# Patient Record
Sex: FEMALE | Employment: OTHER | URBAN - METROPOLITAN AREA
[De-identification: names, ages, dates, MRNs, and addresses within clinical notes are randomized per-mention and may not be internally consistent; named-entity substitution may affect disease eponyms.]

---

## 2020-11-21 ENCOUNTER — HOSPITAL ENCOUNTER (EMERGENCY)
Facility: HOSPITAL | Age: 80
Discharge: HOME | End: 2020-11-21
Payer: COMMERCIAL

## 2020-11-21 VITALS
HEART RATE: 92 BPM | OXYGEN SATURATION: 99 % | DIASTOLIC BLOOD PRESSURE: 55 MMHG | RESPIRATION RATE: 17 BRPM | TEMPERATURE: 98.1 F | SYSTOLIC BLOOD PRESSURE: 155 MMHG

## 2020-11-21 DIAGNOSIS — B02.9 HERPES ZOSTER WITHOUT COMPLICATION: Primary | ICD-10-CM

## 2020-11-21 PROCEDURE — 99284 EMERGENCY DEPT VISIT MOD MDM: CPT | Performed by: PHYSICIAN ASSISTANT

## 2020-11-21 PROCEDURE — 99283 EMERGENCY DEPT VISIT LOW MDM: CPT

## 2020-11-21 RX ORDER — VALACYCLOVIR HYDROCHLORIDE 1 G/1
1000 TABLET, FILM COATED ORAL 3 TIMES DAILY
Qty: 21 TABLET | Refills: 0 | Status: SHIPPED | OUTPATIENT
Start: 2020-11-21 | End: 2020-11-28

## 2020-11-21 RX ORDER — VALACYCLOVIR HYDROCHLORIDE 1 G/1
1000 TABLET, FILM COATED ORAL 3 TIMES DAILY
Qty: 21 TABLET | Refills: 0 | Status: SHIPPED | OUTPATIENT
Start: 2020-11-21 | End: 2020-11-21 | Stop reason: SDUPTHER

## 2022-08-29 ENCOUNTER — HOSPITAL ENCOUNTER (EMERGENCY)
Facility: HOSPITAL | Age: 82
Discharge: HOME | End: 2022-08-29
Attending: EMERGENCY MEDICINE
Payer: COMMERCIAL

## 2022-08-29 ENCOUNTER — APPOINTMENT (OUTPATIENT)
Dept: RADIOLOGY | Facility: HOSPITAL | Age: 82
End: 2022-08-29
Payer: COMMERCIAL

## 2022-08-29 ENCOUNTER — APPOINTMENT (EMERGENCY)
Dept: CT IMAGING | Facility: HOSPITAL | Age: 82
End: 2022-08-29
Payer: COMMERCIAL

## 2022-08-29 VITALS
HEIGHT: 65 IN | RESPIRATION RATE: 16 BRPM | OXYGEN SATURATION: 99 % | DIASTOLIC BLOOD PRESSURE: 86 MMHG | SYSTOLIC BLOOD PRESSURE: 145 MMHG | BODY MASS INDEX: 22.49 KG/M2 | WEIGHT: 135 LBS | HEART RATE: 86 BPM | TEMPERATURE: 98.6 F

## 2022-08-29 DIAGNOSIS — R53.83 FATIGUE: Primary | ICD-10-CM

## 2022-08-29 DIAGNOSIS — R19.7 DIARRHEA: ICD-10-CM

## 2022-08-29 LAB
2HR DELTA HS TROPONIN: 0 NG/L
ALBUMIN SERPL BCP-MCNC: 4.1 G/DL (ref 3.5–5)
ALP SERPL-CCNC: 39 U/L (ref 34–104)
ALT SERPL W P-5'-P-CCNC: 24 U/L (ref 7–52)
ANION GAP SERPL CALCULATED.3IONS-SCNC: 9 MMOL/L (ref 4–13)
AST SERPL W P-5'-P-CCNC: 19 U/L (ref 13–39)
BACTERIA UR QL AUTO: NORMAL /HPF
BASOPHILS # BLD AUTO: 0.05 THOUSANDS/ΜL (ref 0–0.1)
BASOPHILS NFR BLD AUTO: 1 % (ref 0–1)
BILIRUB SERPL-MCNC: 0.43 MG/DL (ref 0.2–1)
BILIRUB UR QL STRIP: NEGATIVE
BUN SERPL-MCNC: 21 MG/DL (ref 5–25)
CALCIUM SERPL-MCNC: 9.8 MG/DL (ref 8.4–10.2)
CARDIAC TROPONIN I PNL SERPL HS: 6 NG/L
CARDIAC TROPONIN I PNL SERPL HS: 6 NG/L
CHLORIDE SERPL-SCNC: 103 MMOL/L (ref 96–108)
CLARITY UR: CLEAR
CO2 SERPL-SCNC: 27 MMOL/L (ref 21–32)
COLOR UR: ABNORMAL
CREAT SERPL-MCNC: 1.38 MG/DL (ref 0.6–1.3)
EOSINOPHIL # BLD AUTO: 0.17 THOUSAND/ΜL (ref 0–0.61)
EOSINOPHIL NFR BLD AUTO: 2 % (ref 0–6)
ERYTHROCYTE [DISTWIDTH] IN BLOOD BY AUTOMATED COUNT: 13 % (ref 11.6–15.1)
FLUAV RNA RESP QL NAA+PROBE: NEGATIVE
FLUBV RNA RESP QL NAA+PROBE: NEGATIVE
GFR SERPL CREATININE-BSD FRML MDRD: 35 ML/MIN/1.73SQ M
GLUCOSE SERPL-MCNC: 106 MG/DL (ref 65–140)
GLUCOSE UR STRIP-MCNC: NEGATIVE MG/DL
HCT VFR BLD AUTO: 39.3 % (ref 34.8–46.1)
HGB BLD-MCNC: 13.4 G/DL (ref 11.5–15.4)
HGB UR QL STRIP.AUTO: NEGATIVE
IMM GRANULOCYTES # BLD AUTO: 0.04 THOUSAND/UL (ref 0–0.2)
IMM GRANULOCYTES NFR BLD AUTO: 0 % (ref 0–2)
KETONES UR STRIP-MCNC: NEGATIVE MG/DL
LEUKOCYTE ESTERASE UR QL STRIP: ABNORMAL
LYMPHOCYTES # BLD AUTO: 2.78 THOUSANDS/ΜL (ref 0.6–4.47)
LYMPHOCYTES NFR BLD AUTO: 29 % (ref 14–44)
MCH RBC QN AUTO: 30.4 PG (ref 26.8–34.3)
MCHC RBC AUTO-ENTMCNC: 34.1 G/DL (ref 31.4–37.4)
MCV RBC AUTO: 89 FL (ref 82–98)
MONOCYTES # BLD AUTO: 0.89 THOUSAND/ΜL (ref 0.17–1.22)
MONOCYTES NFR BLD AUTO: 9 % (ref 4–12)
NEUTROPHILS # BLD AUTO: 5.76 THOUSANDS/ΜL (ref 1.85–7.62)
NEUTS SEG NFR BLD AUTO: 59 % (ref 43–75)
NITRITE UR QL STRIP: NEGATIVE
NON-SQ EPI CELLS URNS QL MICRO: NORMAL /HPF
NRBC BLD AUTO-RTO: 0 /100 WBCS
PH UR STRIP.AUTO: 6 [PH]
PLATELET # BLD AUTO: 143 THOUSANDS/UL (ref 149–390)
PMV BLD AUTO: 12.1 FL (ref 8.9–12.7)
POTASSIUM SERPL-SCNC: 3.3 MMOL/L (ref 3.5–5.3)
PROT SERPL-MCNC: 7.7 G/DL (ref 6.4–8.4)
PROT UR STRIP-MCNC: NEGATIVE MG/DL
RBC # BLD AUTO: 4.41 MILLION/UL (ref 3.81–5.12)
RBC #/AREA URNS AUTO: NORMAL /HPF
RSV RNA RESP QL NAA+PROBE: NEGATIVE
SARS-COV-2 RNA RESP QL NAA+PROBE: NEGATIVE
SODIUM SERPL-SCNC: 139 MMOL/L (ref 135–147)
SP GR UR STRIP.AUTO: <=1.005 (ref 1–1.03)
TSH SERPL DL<=0.05 MIU/L-ACNC: 3.3 UIU/ML (ref 0.45–4.5)
UROBILINOGEN UR QL STRIP.AUTO: 0.2 E.U./DL
WBC # BLD AUTO: 9.69 THOUSAND/UL (ref 4.31–10.16)
WBC #/AREA URNS AUTO: NORMAL /HPF

## 2022-08-29 PROCEDURE — 81001 URINALYSIS AUTO W/SCOPE: CPT | Performed by: EMERGENCY MEDICINE

## 2022-08-29 PROCEDURE — 80053 COMPREHEN METABOLIC PANEL: CPT | Performed by: EMERGENCY MEDICINE

## 2022-08-29 PROCEDURE — 99285 EMERGENCY DEPT VISIT HI MDM: CPT | Performed by: EMERGENCY MEDICINE

## 2022-08-29 PROCEDURE — 36415 COLL VENOUS BLD VENIPUNCTURE: CPT | Performed by: EMERGENCY MEDICINE

## 2022-08-29 PROCEDURE — 71045 X-RAY EXAM CHEST 1 VIEW: CPT

## 2022-08-29 PROCEDURE — 0241U HB NFCT DS VIR RESP RNA 4 TRGT: CPT | Performed by: EMERGENCY MEDICINE

## 2022-08-29 PROCEDURE — 85025 COMPLETE CBC W/AUTO DIFF WBC: CPT | Performed by: EMERGENCY MEDICINE

## 2022-08-29 PROCEDURE — 93005 ELECTROCARDIOGRAM TRACING: CPT

## 2022-08-29 PROCEDURE — 84443 ASSAY THYROID STIM HORMONE: CPT | Performed by: EMERGENCY MEDICINE

## 2022-08-29 PROCEDURE — 99285 EMERGENCY DEPT VISIT HI MDM: CPT

## 2022-08-29 PROCEDURE — G1004 CDSM NDSC: HCPCS

## 2022-08-29 PROCEDURE — 84484 ASSAY OF TROPONIN QUANT: CPT | Performed by: EMERGENCY MEDICINE

## 2022-08-29 PROCEDURE — 74176 CT ABD & PELVIS W/O CONTRAST: CPT

## 2022-08-29 RX ORDER — POTASSIUM CHLORIDE 20 MEQ/1
40 TABLET, EXTENDED RELEASE ORAL ONCE
Status: COMPLETED | OUTPATIENT
Start: 2022-08-29 | End: 2022-08-29

## 2022-08-29 RX ADMIN — POTASSIUM CHLORIDE 40 MEQ: 1500 TABLET, EXTENDED RELEASE ORAL at 21:07

## 2022-08-29 NOTE — ED TRIAGE NOTES
Via WR w/complaint of weakness x1 week; called PCP today & was informed "to take it easy"; contact w/daughter last week who has Covid; denies nausea, vomiting, diarrhea and/or fever; denies loss of appetite; denies abdominal pain and/or headache; denies chest pain and/or SOB; denies pain; denies slurred speech and/or changes in vision

## 2022-08-29 NOTE — ED PROVIDER NOTES
History  Chief Complaint   Patient presents with    Weakness - Generalized     Via WR w/complaint of weakness x1 week; called PCP today & was informed "to take it easy"; contact w/daughter last week who has Covid; denies nausea, vomiting, diarrhea and/or fever; denies loss of appetite; denies abdominal pain and/or headache; denies chest pain and/or SOB; denies pain; denies slurred speech and/or changes in vision     79year-old female with history of CKD, glaucoma, IBS, hypertension who presents for evaluation of generalized weakness  Patient reports that for the past week, she has felt very fatigued and drained  She thought that she was improving this morning when she awoke, however, returned to feeling fatigued throughout the day  She called her primary care physician today who reportedly told her that she probably has COVID and that she should rest   They were unwilling to see her in the office prompting her to seek evaluation in the emergency department  She has had occasional episodes of diarrhea throughout the week  She has a chronic cough that is unchanged from baseline  She also endorses urinary frequency but no other urinary symptoms  She is otherwise asymptomatic and denies chest pain, shortness of breath, abdominal pain, nausea, vomiting, fever  Prior to Admission Medications   Prescriptions Last Dose Informant Patient Reported? Taking?    LORazepam (ATIVAN) 0 5 mg tablet   Yes No   Sig: Take 0 5 mg by mouth daily as needed   Trusopt 2 % ophthalmic solution   Yes No   Sig: instill 1 drop into both eyes twice a day   Xalatan 0 005 % ophthalmic solution   Yes No   Sig: place 1 drop into both eyes at bedtime   candesartan-hydrochlorothiazide (Atacand HCT) 32-12 5 MG per tablet   Yes No   Sig: take 1 tablet by mouth daily   valACYclovir (VALTREX) 1,000 mg tablet   No No   Sig: Take 1 tablet (1,000 mg total) by mouth 3 (three) times a day for 7 days      Facility-Administered Medications: None Past Medical History:   Diagnosis Date    Anxiety     Arthritis     Chronic kidney disease (CKD)     GERD (gastroesophageal reflux disease)     Glaucoma     Hypertension     IBS (irritable bowel syndrome)     Renal disorder        Past Surgical History:   Procedure Laterality Date    APPENDECTOMY      CHOLECYSTECTOMY      PATELLAR TENDON REPAIR Right     TUBAL LIGATION         History reviewed  No pertinent family history  I have reviewed and agree with the history as documented  E-Cigarette/Vaping    E-Cigarette Use Never User      E-Cigarette/Vaping Substances    Nicotine No     THC No     CBD No     Flavoring No     Other No     Unknown No      Social History     Tobacco Use    Smoking status: Never Smoker    Smokeless tobacco: Never Used   Vaping Use    Vaping Use: Never used   Substance Use Topics    Alcohol use: Never    Drug use: Not Currently       Review of Systems   Constitutional: Positive for fatigue  Negative for chills and fever  Eyes: Negative for visual disturbance  Respiratory: Positive for cough (Chronic)  Negative for chest tightness and shortness of breath  Cardiovascular: Negative for chest pain and leg swelling  Gastrointestinal: Negative for abdominal pain, diarrhea, nausea and vomiting  Genitourinary: Positive for frequency  Negative for dysuria, flank pain and urgency  Musculoskeletal: Negative for back pain and gait problem  Skin: Negative for pallor and rash  Neurological: Positive for weakness  Negative for syncope, light-headedness and headaches  All other systems reviewed and are negative  Physical Exam  Physical Exam  Vitals and nursing note reviewed  Constitutional:       General: She is not in acute distress  Appearance: She is well-developed  HENT:      Head: Normocephalic and atraumatic        Nose: Nose normal       Mouth/Throat:      Mouth: Mucous membranes are moist    Eyes:      Extraocular Movements: Extraocular movements intact  Pupils: Pupils are equal, round, and reactive to light  Cardiovascular:      Rate and Rhythm: Normal rate and regular rhythm  Heart sounds: No murmur heard  No friction rub  No gallop  Pulmonary:      Effort: Pulmonary effort is normal       Breath sounds: Normal breath sounds  No wheezing, rhonchi or rales  Abdominal:      General: There is no distension  Palpations: Abdomen is soft  Tenderness: There is abdominal tenderness in the right lower quadrant, suprapubic area and left lower quadrant  There is no guarding or rebound  Musculoskeletal:         General: No swelling or tenderness  Normal range of motion  Cervical back: Normal range of motion and neck supple  Skin:     General: Skin is warm and dry  Coloration: Skin is not pale  Findings: No rash  Neurological:      General: No focal deficit present  Mental Status: She is alert and oriented to person, place, and time     Psychiatric:         Behavior: Behavior normal          Vital Signs  ED Triage Vitals [08/29/22 1837]   Temperature Pulse Respirations Blood Pressure SpO2   98 6 °F (37 °C) 90 16 155/75 98 %      Temp Source Heart Rate Source Patient Position - Orthostatic VS BP Location FiO2 (%)   Oral Monitor Sitting Left arm --      Pain Score       No Pain           Vitals:    08/29/22 1837 08/29/22 2145   BP: 155/75 145/86   Pulse: 90 86   Patient Position - Orthostatic VS: Sitting Lying         Visual Acuity      ED Medications  Medications   potassium chloride (K-DUR,KLOR-CON) CR tablet 40 mEq (40 mEq Oral Given 8/29/22 2107)       Diagnostic Studies  Results Reviewed     Procedure Component Value Units Date/Time    HS Troponin I 2hr [[id number]]  (Normal) Collected: 08/29/22 2114    Lab Status: Final result Specimen: Blood from Line, Venous Updated: 08/29/22 2142     hs TnI 2hr 6 ng/L      Delta 2hr hsTnI 0 ng/L     Urine Microscopic [[id number]]  (Normal) Collected: 08/29/22 2013 Lab Status: Final result Specimen: Urine, Clean Catch Updated: 08/29/22 2030     RBC, UA 0-1 /hpf      WBC, UA 1-2 /hpf      Epithelial Cells Occasional /hpf      Bacteria, UA Occasional /hpf     UA w Reflex to Microscopic w Reflex to Culture [[id number]]  (Abnormal) Collected: 08/29/22 2013    Lab Status: Final result Specimen: Urine, Clean Catch Updated: 08/29/22 2021     Color, UA Straw     Clarity, UA Clear     Specific Gravity, UA <=1 005     pH, UA 6 0     Leukocytes, UA Trace     Nitrite, UA Negative     Protein, UA Negative mg/dl      Glucose, UA Negative mg/dl      Ketones, UA Negative mg/dl      Urobilinogen, UA 0 2 E U /dl      Bilirubin, UA Negative     Occult Blood, UA Negative    CBC and differential [[id number]]  (Abnormal) Collected: 08/29/22 1924    Lab Status: Final result Specimen: Blood from Arm, Right Updated: 08/29/22 2015     WBC 9 69 Thousand/uL      RBC 4 41 Million/uL      Hemoglobin 13 4 g/dL      Hematocrit 39 3 %      MCV 89 fL      MCH 30 4 pg      MCHC 34 1 g/dL      RDW 13 0 %      MPV 12 1 fL      Platelets 803 Thousands/uL      nRBC 0 /100 WBCs      Neutrophils Relative 59 %      Immat GRANS % 0 %      Lymphocytes Relative 29 %      Monocytes Relative 9 %      Eosinophils Relative 2 %      Basophils Relative 1 %      Neutrophils Absolute 5 76 Thousands/µL      Immature Grans Absolute 0 04 Thousand/uL      Lymphocytes Absolute 2 78 Thousands/µL      Monocytes Absolute 0 89 Thousand/µL      Eosinophils Absolute 0 17 Thousand/µL      Basophils Absolute 0 05 Thousands/µL     TSH, 3rd generation with Free T4 reflex [[id number]]  (Normal) Collected: 08/29/22 1924    Lab Status: Final result Specimen: Blood from Arm, Right Updated: 08/29/22 2007     TSH 3RD GENERATON 3 303 uIU/mL     Narrative:      Patients undergoing fluorescein dye angiography may retain small amounts of fluorescein in the body for 48-72 hours post procedure   Samples containing fluorescein can produce falsely depressed TSH values  If the patient had this procedure,a specimen should be resubmitted post fluorescein clearance  FLU/RSV/COVID - if FLU/RSV clinically relevant [[id number]]  (Normal) Collected: 08/29/22 1913    Lab Status: Final result Specimen: Nares from Nose Updated: 08/29/22 2001     SARS-CoV-2 Negative     INFLUENZA A PCR Negative     INFLUENZA B PCR Negative     RSV PCR Negative    Narrative:      FOR PEDIATRIC PATIENTS - copy/paste COVID Guidelines URL to browser: [email or web address]  LinkCycle    SARS-CoV-2 assay is a Nucleic Acid Amplification assay intended for the  qualitative detection of nucleic acid from SARS-CoV-2 in nasopharyngeal  swabs  Results are for the presumptive identification of SARS-CoV-2 RNA  Positive results are indicative of infection with SARS-CoV-2, the virus  causing COVID-19, but do not rule out bacterial infection or co-infection  with other viruses  Laboratories within the [place] and its  territories are required to report all positive results to the appropriate  public health authorities  Negative results do not preclude SARS-CoV-2  infection and should not be used as the sole basis for treatment or other  patient management decisions  Negative results must be combined with  clinical observations, patient history, and epidemiological information  This test has not been FDA cleared or approved  This test has been authorized by FDA under an Emergency Use Authorization  (EUA)  This test is only authorized for the duration of time the  declaration that circumstances exist justifying the authorization of the  emergency use of an in vitro diagnostic tests for detection of SARS-CoV-2  virus and/or diagnosis of COVID-19 infection under section 564(b)(1) of  the Act, 21 U  S C  781WGE-6(M)(5), unless the authorization is terminated  or revoked sooner  The test has been validated but independent review by FDA  and CLIA is pending      Test performed using Cepheid GeneXpert: This RT-PCR assay targets N2,  a region unique to SARS-CoV-2  A conserved region in the E-gene was chosen  for pan-Sarbecovirus detection which includes SARS-CoV-2  HS Troponin 0hr (reflex protocol) [[id number]]  (Normal) Collected: 08/29/22 1924    Lab Status: Final result Specimen: Blood from Arm, Right Updated: 08/29/22 1958     hs TnI 0hr 6 ng/L     Comprehensive metabolic panel [[id number]]  (Abnormal) Collected: 08/29/22 1924    Lab Status: Final result Specimen: Blood from Arm, Right Updated: 08/29/22 1954     Sodium 139 mmol/L      Potassium 3 3 mmol/L      Chloride 103 mmol/L      CO2 27 mmol/L      ANION GAP 9 mmol/L      BUN 21 mg/dL      Creatinine 1 38 mg/dL      Glucose 106 mg/dL      Calcium 9 8 mg/dL      AST 19 U/L      ALT 24 U/L      Alkaline Phosphatase 39 U/L      Total Protein 7 7 g/dL      Albumin 4 1 g/dL      Total Bilirubin 0 43 mg/dL      eGFR 35 ml/min/1 73sq m     Narrative:      [facility] guidelines for Chronic Kidney Disease (CKD):     Stage 1 with normal or high GFR (GFR > 90 mL/min/1 73 square meters)    Stage 2 Mild CKD (GFR = 60-89 mL/min/1 73 square meters)    Stage 3A Moderate CKD (GFR = 45-59 mL/min/1 73 square meters)    Stage 3B Moderate CKD (GFR = 30-44 mL/min/1 73 square meters)    Stage 4 Severe CKD (GFR = 15-29 mL/min/1 73 square meters)    Stage 5 End Stage CKD (GFR <15 mL/min/1 73 square meters)  Note: GFR calculation is accurate only with a steady state creatinine                 CT abdomen pelvis wo contrast   Final Result by Samantha Pal MD (08/29 2047)         1  Diverticulosis  No evidence of acute diverticulitis  No acute findings in the abdomen or pelvis  Workstation performed: [id number]         XR chest 1 view portable   ED Interpretation by Prince Kelli MD (08/29 2001)   No acute cardiopulmonary abnormality        Final Result by Remy Parry MD (08/30 0949)      No acute cardiopulmonary disease  Workstation performed: [id number]                    Procedures  Procedures         ED Course  ED Course as of 09/03/22 1110   Mon Aug 29, 2022   1921 Procedure Note: EKG  Date/Time: 08/29/22 7:15 PM   Interpreted by: Carolann Baumann  Indications / Diagnosis: weakness  ECG reviewed by me, the ED Provider: yes   The EKG demonstrates:  Rhythm: rate 83, normal sinus  Intervals: normal intervals  Axis: normal axis  QRS/Blocks: incomplete RBBB  ST Changes: No acute ST Changes, no STD/NIMA  TWI V1, V2       1959 hs TnI 0hr: 6   1959 Creatinine(!): 1 38  Baseline 1 1-1 2   2000 Potassium(!): 3 3   2003 FLU/RSV/COVID - if FLU/RSV clinically relevant   [facility]   2017 CBC and differential(!)                               SBIRT 20yo+    Flowsheet Row Most Recent Value   SBIRT (23 yo +)    In order to provide better care to our patients, we are screening all of our patients for alcohol and drug use  Would it be okay to ask you these screening questions? No Filed at: 08/29/2022 1900                    MDM  Number of Diagnoses or Management Options  Diarrhea: new and requires workup  Fatigue: new and requires workup  Diagnosis management comments: 79 YOF who presents for fatigue, diarrhea  Stable vital signs, benign exam  Will obtain cardiac workup, abdominal labs, CTAP, UA, TSH  Signed out to Dr Sid Correia pending CT and delta troponin  On chart review, labs WNL and CTAP without acute findings  Patient discharged          Amount and/or Complexity of Data Reviewed  Clinical lab tests: ordered and reviewed  Tests in the radiology section of CPT®: ordered and reviewed  Review and summarize past medical records: yes    Risk of Complications, Morbidity, and/or Mortality  Presenting problems: high  Diagnostic procedures: low  Management options: low    Patient Progress  Patient progress: stable      Disposition  Final diagnoses:   Fatigue   Diarrhea     Time reflects when diagnosis was documented in both MDM as applicable and the Disposition within this note     Time User Action Codes Description Comment    8/29/2022  8:38 PM Wilhemina Cradle Add [R53 83] Fatigue     8/29/2022  8:38 PM Wilhemina Cradle Add [R19 7] Diarrhea       ED Disposition     ED Disposition   Discharge    Condition   Stable    Date/Time   Mon Aug 29, 2022  9:45 PM    [email or web address] 48 discharge to home/self care  Results of completed tests discussed  Return to ER precautions given, verbal and written, and questions answered satisfactorily  Follow-up Information     Follow up With Specialties Details Why Contact Info    Lucy Childs MD Internal Medicine In 2 days  [facility]  [address]  [phone]            Discharge Medication List as of 8/29/2022  9:45 PM      CONTINUE these medications which have NOT CHANGED    Details   candesartan-hydrochlorothiazide (Atacand HCT) 32-12 5 MG per tablet take 1 tablet by mouth daily, Historical Med      LORazepam (ATIVAN) 0 5 mg tablet Take 0 5 mg by mouth daily as needed, Starting Mon 4/5/2021, Historical Med      Trusopt 2 % ophthalmic solution instill 1 drop into both eyes twice a day, Historical Med      valACYclovir (VALTREX) 1,000 mg tablet Take 1 tablet (1,000 mg total) by mouth 3 (three) times a day for 7 days, Starting Sat 11/21/2020, Until Sat 11/28/2020, Print      Xalatan 0 005 % ophthalmic solution place 1 drop into both eyes at bedtime, Historical Med             No discharge procedures on file      PDMP Review     None          ED Provider  Electronically Signed by           Jacqulynn Canavan, MD  09/03/22 7174

## 2022-08-30 LAB
ATRIAL RATE: 83 BPM
P AXIS: 71 DEGREES
PR INTERVAL: 144 MS
QRS AXIS: -13 DEGREES
QRSD INTERVAL: 106 MS
QT INTERVAL: 408 MS
QTC INTERVAL: 479 MS
T WAVE AXIS: 26 DEGREES
VENTRICULAR RATE: 83 BPM

## 2022-08-30 PROCEDURE — 93010 ELECTROCARDIOGRAM REPORT: CPT | Performed by: INTERNAL MEDICINE

## 2022-08-30 NOTE — DISCHARGE INSTRUCTIONS
Follow up with your primary care physician  Please return to the emergency department if you develop worsening symptoms, chest pain, shortness of breath, fever, vomiting, or anything else concerning to you

## 2022-10-10 ENCOUNTER — APPOINTMENT (EMERGENCY)
Dept: CT IMAGING | Facility: HOSPITAL | Age: 82
End: 2022-10-10
Payer: COMMERCIAL

## 2022-10-10 ENCOUNTER — APPOINTMENT (OUTPATIENT)
Dept: RADIOLOGY | Facility: HOSPITAL | Age: 82
End: 2022-10-10
Payer: COMMERCIAL

## 2022-10-10 ENCOUNTER — HOSPITAL ENCOUNTER (EMERGENCY)
Facility: HOSPITAL | Age: 82
Discharge: HOME | End: 2022-10-10
Attending: EMERGENCY MEDICINE | Admitting: EMERGENCY MEDICINE
Payer: COMMERCIAL

## 2022-10-10 VITALS
SYSTOLIC BLOOD PRESSURE: 163 MMHG | RESPIRATION RATE: 18 BRPM | DIASTOLIC BLOOD PRESSURE: 73 MMHG | OXYGEN SATURATION: 98 % | HEART RATE: 90 BPM | TEMPERATURE: 97.8 F

## 2022-10-10 DIAGNOSIS — S09.90XA CLOSED HEAD INJURY, INITIAL ENCOUNTER: ICD-10-CM

## 2022-10-10 DIAGNOSIS — M54.6 ACUTE MIDLINE THORACIC BACK PAIN: ICD-10-CM
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
## 2022-10-10 DIAGNOSIS — M51.36 DEGENERATIVE DISC DISEASE, LUMBAR: ICD-10-CM

## 2022-10-10 DIAGNOSIS — M25.551 RIGHT HIP PAIN: ICD-10-CM

## 2022-10-10 PROCEDURE — 99282 EMERGENCY DEPT VISIT SF MDM: CPT | Performed by: EMERGENCY MEDICINE

## 2022-10-10 PROCEDURE — 72128 CT CHEST SPINE W/O DYE: CPT

## 2022-10-10 PROCEDURE — 70450 CT HEAD/BRAIN W/O DYE: CPT

## 2022-10-10 PROCEDURE — G1004 CDSM NDSC: HCPCS

## 2022-10-10 PROCEDURE — 99284 EMERGENCY DEPT VISIT MOD MDM: CPT

## 2022-10-10 PROCEDURE — 72131 CT LUMBAR SPINE W/O DYE: CPT

## 2022-10-10 PROCEDURE — 73552 X-RAY EXAM OF FEMUR 2/>: CPT

## 2022-10-10 PROCEDURE — 72170 X-RAY EXAM OF PELVIS: CPT

## 2022-10-10 NOTE — DISCHARGE INSTRUCTIONS
Follow up with your primary care physician  You can use tylenol and motrin every 6 hours as needed for pain  Please return to the emergency department if you develop worsening symptoms, severe pain, weakness in your legs, numbness in your legs, issues with your bowels or bladder, cannot walk, or anything else concerning to you

## 2022-10-10 NOTE — ED PROVIDER NOTES
History  Chief Complaint   Patient presents with   • Fall     Patient here with c/o a fall last night  Pt stated tripping over cat and hit head on left side  Pt denies LOC  Pt c/o of mid back pain that started this morning following fall last night  Denies any nausea  70-year-old female with history of arthritis, CKD, GERD, IBS, hypertension who presents for evaluation after fall  Patient reports that last night, she was trying to get into bed without disturbing her cat and accidentally rolled out of bed  She struck the left side of her head on the wall but did not lose consciousness  She was able to get herself up off of the floor and into bed  Since the fall, she has had midline thoracic and lumbar back pain as well as right hip and upper leg pain  Despite this pain, she has been able to ambulate and climb the stairs today  She did not take anything for pain this morning  She denies any headache, vision changes, weakness or numbness, chest pain, shortness of breath, abdominal pain, bowel/bladder dysfunction  Prior to Admission Medications   Prescriptions Last Dose Informant Patient Reported? Taking?    LORazepam (ATIVAN) 0 5 mg tablet 10/10/2022 at Unknown time  Yes Yes   Sig: Take 0 5 mg by mouth daily as needed   Trusopt 2 % ophthalmic solution Not Taking at Unknown time  Yes No   Sig: instill 1 drop into both eyes twice a day   Patient not taking: Reported on 10/10/2022   Xalatan 0 005 % ophthalmic solution 10/10/2022 at Unknown time  Yes Yes   Sig: place 1 drop into both eyes at bedtime   candesartan-hydrochlorothiazide (ATACAND HCT) 32-12 5 MG per tablet Not Taking at Unknown time  Yes No   Sig: take 1 tablet by mouth daily   Patient not taking: Reported on 10/10/2022   valACYclovir (VALTREX) 1,000 mg tablet   No No   Sig: Take 1 tablet (1,000 mg total) by mouth 3 (three) times a day for 7 days      Facility-Administered Medications: None       Past Medical History:   Diagnosis Date   • Anxiety    • Arthritis    • Chronic kidney disease (CKD)    • GERD (gastroesophageal reflux disease)    • Glaucoma    • Hypertension    • IBS (irritable bowel syndrome)    • Renal disorder        Past Surgical History:   Procedure Laterality Date   • APPENDECTOMY     • CHOLECYSTECTOMY     • PATELLAR TENDON REPAIR Right    • TUBAL LIGATION         History reviewed  No pertinent family history  I have reviewed and agree with the history as documented  E-Cigarette/Vaping   • E-Cigarette Use Never User      E-Cigarette/Vaping Substances   • Nicotine No    • THC No    • CBD No    • Flavoring No    • Other No    • Unknown No      Social History     Tobacco Use   • Smoking status: Never Smoker   • Smokeless tobacco: Never Used   Vaping Use   • Vaping Use: Never used   Substance Use Topics   • Alcohol use: Never   • Drug use: Not Currently       Review of Systems   Constitutional: Negative for chills and fever  HENT: Negative for congestion and sore throat  Eyes: Negative for visual disturbance  Respiratory: Negative for chest tightness and shortness of breath  Cardiovascular: Negative for chest pain and leg swelling  Gastrointestinal: Negative for abdominal pain, nausea and vomiting  Genitourinary: Negative for difficulty urinating and flank pain  Musculoskeletal: Positive for arthralgias and back pain  Negative for gait problem and neck pain  Skin: Negative for rash and wound  Neurological: Negative for syncope, weakness, light-headedness, numbness and headaches  All other systems reviewed and are negative  Physical Exam  Physical Exam  Vitals and nursing note reviewed  Constitutional:       General: She is not in acute distress  Appearance: She is well-developed  She is not ill-appearing  HENT:      Head: Normocephalic and atraumatic        Nose: Nose normal       Mouth/Throat:      Mouth: Mucous membranes are moist       Pharynx: No oropharyngeal exudate or posterior oropharyngeal erythema  Comments: No intraoral injury  Eyes:      Extraocular Movements: Extraocular movements intact  Pupils: Pupils are equal, round, and reactive to light  Cardiovascular:      Rate and Rhythm: Normal rate and regular rhythm  Heart sounds: No murmur heard  No friction rub  No gallop  Pulmonary:      Effort: Pulmonary effort is normal       Breath sounds: Normal breath sounds  No wheezing, rhonchi or rales  Chest:      Chest wall: No tenderness  Abdominal:      General: There is no distension  Palpations: Abdomen is soft  Tenderness: There is no abdominal tenderness  Musculoskeletal:         General: No swelling or tenderness  Normal range of motion  Cervical back: Normal range of motion and neck supple  No tenderness  Comments: No midline T or L-spine tenderness  No tenderness to the right hip or femur, however, there is pain with range of motion of the right hip  All other joints with full, painless range of motion and nontender  Skin:     General: Skin is warm and dry  Coloration: Skin is not pale  Findings: No rash  Comments: No external signs of trauma  Neurological:      General: No focal deficit present  Mental Status: She is alert and oriented to person, place, and time  Comments: Cranial nerves 2-12 intact  5/5 strength and sensation intact all 4 extremities     Psychiatric:         Behavior: Behavior normal          Vital Signs  ED Triage Vitals [10/10/22 1325]   Temperature Pulse Respirations Blood Pressure SpO2   97 8 °F (36 6 °C) 94 18 163/73 100 %      Temp Source Heart Rate Source Patient Position - Orthostatic VS BP Location FiO2 (%)   Oral Monitor Sitting Right arm --      Pain Score       --           Vitals:    10/10/22 1325 10/10/22 1458   BP: 163/73    Pulse: 94 90   Patient Position - Orthostatic VS: Sitting          Visual Acuity      ED Medications  Medications - No data to display    Diagnostic Studies  Results Reviewed     None                 CT thoracic spine without contrast   Final Result by Laurie Keith MD (10/10 2166)   1  The thoracic vertebral body heights are maintained demonstrating no acute fracture or subluxation  2  Central disc osteophyte complex is noted at the T6-7 level with mild to moderate central canal narrowing  3  Subcentimeter sclerotic lesion within the inferior T3 vertebral body, probable bone island  Workstation performed: [id number]         CT lumbar spine without contrast   Final Result by Laurie Keith MD (10/10 )   1  The lumbar vertebral body heights are maintained demonstrating no acute fracture or subluxation  2  Multilevel degenerative disc disease with moderate to severe central canal stenosis at L4-5  Workstation performed: [id number]         CT head without contrast   Final Result by Laurie Keith MD (10/10 [phone])      1  No acute intracranial abnormality  2   Mild chronic microangiopathic ischemic changes  Workstation performed: [id number]         XR femur 2 views RIGHT   Final Result by Viktoria Zeng MD (10/10 [phone])      No acute osseous abnormality  Workstation performed: [id number]         XR pelvis ap only 1 or 2 vw   Final Result by Viktoria Zeng MD (10/10 [phone])      No acute osseous abnormality  Workstation performed: [id number]                    Procedures  Procedures         ED Course                                             MDM  Number of Diagnoses or Management Options  Acute midline thoracic back pain: new and requires workup  Closed head injury, initial encounter: new and requires workup  Degenerative disc disease, lumbar: new and requires workup  Fall, initial encounter: new and requires workup  Right hip pain: new and requires workup  Diagnosis management comments: 79year old female who presents for evaluation after a mechanical fall last evening   Given head strike, will obtain CT head as well as CT T and L spine given new pain  Will XR the right hip and femur  Offered analgesia but patient declining at this time  Imaging without any acute traumatic injuries  Incidentally noted chronic degenerative disc disease which patient was informed of  No red flag signs or symptoms regarding her back pain, normal neurologic exam  Patient able to ambulate  Advised follow up with primary care physician  Return precautions discussed  Amount and/or Complexity of Data Reviewed  Tests in the radiology section of CPT®: ordered and reviewed  Review and summarize past medical records: yes    Risk of Complications, Morbidity, and/or Mortality  Presenting problems: high  Diagnostic procedures: low  Management options: minimal    Patient Progress  Patient progress: stable      Disposition  Final diagnoses:   Fall, initial encounter   Closed head injury, initial encounter   Acute midline thoracic back pain   Right hip pain   Degenerative disc disease, lumbar     Time reflects when diagnosis was documented in both MDM as applicable and the Disposition within this note     Time User Action Codes Description Comment    10/10/2022  3:38 PM Geofm Alona Add [C17  ZKFN] Fall, initial encounter     10/10/2022  3:38 PM Geofm Alona Add [S09 90XA] Closed head injury, initial encounter     10/10/2022  3:38 PM Geofm Alona Add [M54 6] Acute midline thoracic back pain     10/10/2022  3:38 PM Geofm Alona Add [M25 551] Right hip pain     10/10/2022  3:39 PM Geofm Alona Add [M51 36] Degenerative disc disease, lumbar       ED Disposition     ED Disposition   Discharge    Condition   Stable    Date/Time   Mon Oct 10, 2022  3:38 PM    Comment   Nicole Washburn discharge to home/self care                 Follow-up Information     Follow up With Specialties Details Why Contact Info    Rodrigo Cruz MD Internal Medicine In 2 days  [address]             Discharge Medication List as of 10/10/2022  3:39 PM      CONTINUE these medications which have NOT CHANGED    Details   LORazepam (ATIVAN) 0 5 mg tablet Take 0 5 mg by mouth daily as needed, Starting Mon 4/5/2021, Historical Med      Xalatan 0 005 % ophthalmic solution place 1 drop into both eyes at bedtime, Historical Med      candesartan-hydrochlorothiazide (ATACAND HCT) 32-12 5 MG per tablet take 1 tablet by mouth daily, Historical Med      Trusopt 2 % ophthalmic solution instill 1 drop into both eyes twice a day, Historical Med      valACYclovir (VALTREX) 1,000 mg tablet Take 1 tablet (1,000 mg total) by mouth 3 (three) times a day for 7 days, Starting Sat 11/21/2020, Until Sat 11/28/2020, Print             No discharge procedures on file      PDMP Review     None          ED Provider  Electronically Signed by           Mila Alexis MD  10/10/22 9232

## 2022-10-24 ENCOUNTER — APPOINTMENT (EMERGENCY)
Dept: CT IMAGING | Facility: HOSPITAL | Age: 82
End: 2022-10-24
Payer: COMMERCIAL

## 2022-10-24 ENCOUNTER — HOSPITAL ENCOUNTER (EMERGENCY)
Facility: HOSPITAL | Age: 82
Discharge: HOME | End: 2022-10-24
Attending: EMERGENCY MEDICINE
Payer: COMMERCIAL

## 2022-10-24 VITALS
TEMPERATURE: 97.9 F | RESPIRATION RATE: 18 BRPM | HEART RATE: 79 BPM | HEIGHT: 62 IN | OXYGEN SATURATION: 98 % | BODY MASS INDEX: 24.84 KG/M2 | SYSTOLIC BLOOD PRESSURE: 134 MMHG | DIASTOLIC BLOOD PRESSURE: 50 MMHG | WEIGHT: 135 LBS

## 2022-10-24 DIAGNOSIS — R42 DIZZINESS: Primary | ICD-10-CM

## 2022-10-24 LAB
2HR DELTA HS TROPONIN: 0 NG/L
ALBUMIN SERPL BCP-MCNC: 3.9 G/DL (ref 3.5–5)
ALP SERPL-CCNC: 51 U/L (ref 34–104)
ALT SERPL W P-5'-P-CCNC: 21 U/L (ref 7–52)
ANION GAP SERPL CALCULATED.3IONS-SCNC: 9 MMOL/L (ref 4–13)
AST SERPL W P-5'-P-CCNC: 20 U/L (ref 13–39)
BASOPHILS # BLD AUTO: 0.07 THOUSANDS/ÂΜL (ref 0–0.1)
BASOPHILS NFR BLD AUTO: 1 % (ref 0–1)
BILIRUB SERPL-MCNC: 0.27 MG/DL (ref 0.2–1)
BUN SERPL-MCNC: 23 MG/DL (ref 5–25)
CALCIUM SERPL-MCNC: 9.8 MG/DL (ref 8.4–10.2)
CARDIAC TROPONIN I PNL SERPL HS: 7 NG/L
CARDIAC TROPONIN I PNL SERPL HS: 7 NG/L
CHLORIDE SERPL-SCNC: 102 MMOL/L (ref 96–108)
CO2 SERPL-SCNC: 29 MMOL/L (ref 21–32)
CREAT SERPL-MCNC: 1.21 MG/DL (ref 0.6–1.3)
EOSINOPHIL # BLD AUTO: 0.21 THOUSAND/ÂΜL (ref 0–0.61)
EOSINOPHIL NFR BLD AUTO: 2 % (ref 0–6)
ERYTHROCYTE [DISTWIDTH] IN BLOOD BY AUTOMATED COUNT: 13.1 % (ref 11.6–15.1)
GFR SERPL CREATININE-BSD FRML MDRD: 41 ML/MIN/1.73SQ M
GLUCOSE SERPL-MCNC: 120 MG/DL (ref 65–140)
HCT VFR BLD AUTO: 40.9 % (ref 34.8–46.1)
HGB BLD-MCNC: 13.5 G/DL (ref 11.5–15.4)
IMM GRANULOCYTES # BLD AUTO: 0.03 THOUSAND/UL (ref 0–0.2)
IMM GRANULOCYTES NFR BLD AUTO: 0 % (ref 0–2)
LYMPHOCYTES # BLD AUTO: 3.44 THOUSANDS/ÂΜL (ref 0.6–4.47)
LYMPHOCYTES NFR BLD AUTO: 33 % (ref 14–44)
MCH RBC QN AUTO: 30.3 PG (ref 26.8–34.3)
MCHC RBC AUTO-ENTMCNC: 33 G/DL (ref 31.4–37.4)
MCV RBC AUTO: 92 FL (ref 82–98)
MONOCYTES # BLD AUTO: 1.02 THOUSAND/ÂΜL (ref 0.17–1.22)
MONOCYTES NFR BLD AUTO: 10 % (ref 4–12)
NEUTROPHILS # BLD AUTO: 5.53 THOUSANDS/ÂΜL (ref 1.85–7.62)
NEUTS SEG NFR BLD AUTO: 54 % (ref 43–75)
NRBC BLD AUTO-RTO: 0 /100 WBCS
PLATELET # BLD AUTO: 216 THOUSANDS/UL (ref 149–390)
PMV BLD AUTO: 11.5 FL (ref 8.9–12.7)
POTASSIUM SERPL-SCNC: 3.4 MMOL/L (ref 3.5–5.3)
PROT SERPL-MCNC: 7.5 G/DL (ref 6.4–8.4)
RBC # BLD AUTO: 4.45 MILLION/UL (ref 3.81–5.12)
SODIUM SERPL-SCNC: 140 MMOL/L (ref 135–147)
WBC # BLD AUTO: 10.3 THOUSAND/UL (ref 4.31–10.16)

## 2022-10-24 PROCEDURE — 93005 ELECTROCARDIOGRAM TRACING: CPT

## 2022-10-24 PROCEDURE — 84484 ASSAY OF TROPONIN QUANT: CPT

## 2022-10-24 PROCEDURE — 70450 CT HEAD/BRAIN W/O DYE: CPT

## 2022-10-24 PROCEDURE — G1004 CDSM NDSC: HCPCS

## 2022-10-24 PROCEDURE — 99284 EMERGENCY DEPT VISIT MOD MDM: CPT | Performed by: EMERGENCY MEDICINE

## 2022-10-24 PROCEDURE — 80053 COMPREHEN METABOLIC PANEL: CPT

## 2022-10-24 PROCEDURE — 36415 COLL VENOUS BLD VENIPUNCTURE: CPT

## 2022-10-24 PROCEDURE — 85025 COMPLETE CBC W/AUTO DIFF WBC: CPT

## 2022-10-24 RX ORDER — POTASSIUM CHLORIDE 20 MEQ/1
20 TABLET, EXTENDED RELEASE ORAL ONCE
Status: COMPLETED | OUTPATIENT
Start: 2022-10-24 | End: 2022-10-24

## 2022-10-24 RX ADMIN — POTASSIUM CHLORIDE 20 MEQ: 1500 TABLET, EXTENDED RELEASE ORAL at 21:27

## 2022-10-25 NOTE — ED PROVIDER NOTES
History  Chief Complaint   Patient presents with   • Dizziness     States she was watching TV and became "lightheaded" states when she stand she feels as if she will fall over     The patient reports that she suddenly felt lightheaded when she was sitting and watching TV  He then stood up and felt like she was “drunk" because she felt off balance  She denies any difficulty with coordination  She denies any numbness  No difficulty with speech or swallowing  She has a just before this happened she had been bending forward for about 30 minutes because she was “doing her toes  “  She feels well when she is in bed but still feels somewhat unsteady when she is on her feet  Nevertheless, she is still able to ambulate despite this unsteady feeling  She denies any chest pain or discomfort  She denies any fevers or chills  She denies any difficulty breathing  No cough congestion  No passing out  No palpitations  No hearing loss  Symptoms were abrupt in onset and are intermittent with movement  Prior to Admission Medications   Prescriptions Last Dose Informant Patient Reported? Taking?    LORazepam (ATIVAN) 0 5 mg tablet   Yes No   Sig: Take 0 5 mg by mouth daily as needed   Trusopt 2 % ophthalmic solution   Yes No   Sig: instill 1 drop into both eyes twice a day   Patient not taking: Reported on 10/10/2022   Xalatan 0 005 % ophthalmic solution   Yes No   Sig: place 1 drop into both eyes at bedtime   candesartan-hydrochlorothiazide (ATACAND HCT) 32-12 5 MG per tablet   Yes No   Sig: take 1 tablet by mouth daily   Patient not taking: Reported on 10/10/2022   valACYclovir (VALTREX) 1,000 mg tablet   No No   Sig: Take 1 tablet (1,000 mg total) by mouth 3 (three) times a day for 7 days      Facility-Administered Medications: None       Past Medical History:   Diagnosis Date   • Anxiety    • Arthritis    • Chronic kidney disease (CKD)    • GERD (gastroesophageal reflux disease)    • Glaucoma    • Hypertension • IBS (irritable bowel syndrome)    • Renal disorder        Past Surgical History:   Procedure Laterality Date   • APPENDECTOMY     • CHOLECYSTECTOMY     • PATELLAR TENDON REPAIR Right    • TUBAL LIGATION         History reviewed  No pertinent family history  I have reviewed and agree with the history as documented  E-Cigarette/Vaping   • E-Cigarette Use Never User      E-Cigarette/Vaping Substances   • Nicotine No    • THC No    • CBD No    • Flavoring No    • Other No    • Unknown No      Social History     Tobacco Use   • Smoking status: Never Smoker   • Smokeless tobacco: Never Used   Vaping Use   • Vaping Use: Never used   Substance Use Topics   • Alcohol use: Never   • Drug use: Not Currently       Review of Systems   All other systems reviewed and are negative  Physical Exam  Physical Exam  Vitals and nursing note reviewed  Constitutional:       General: She is not in acute distress  Appearance: She is well-developed  HENT:      Head: Normocephalic and atraumatic  Eyes:      General: No visual field deficit  Conjunctiva/sclera: Conjunctivae normal    Cardiovascular:      Rate and Rhythm: Normal rate and regular rhythm  Heart sounds: No murmur heard  Pulmonary:      Effort: Pulmonary effort is normal  No respiratory distress  Breath sounds: Normal breath sounds  Abdominal:      Palpations: Abdomen is soft  Tenderness: There is no abdominal tenderness  Musculoskeletal:      Cervical back: Neck supple  Skin:     General: Skin is warm and dry  Capillary Refill: Capillary refill takes 2 to 3 seconds  Neurological:      Mental Status: She is alert  Cranial Nerves: Cranial nerves are intact  No facial asymmetry  Sensory: Sensation is intact  Motor: No weakness or pronator drift  Coordination: Heel to Shin Test normal       Gait: Gait is intact  Comments: Normal finger-to-nose    Patient has no nystagmus        Negative [place]-Hallpike maneuver         Vital Signs  ED Triage Vitals [10/24/22 2042]   Temperature Pulse Respirations Blood Pressure SpO2   97 9 °F (36 6 °C) 89 18 (!) 176/60 98 %      Temp Source Heart Rate Source Patient Position - Orthostatic VS BP Location FiO2 (%)   Oral -- -- -- --      Pain Score       No Pain           Vitals:    10/24/22 2042 10/24/22 2125 10/24/22 2230   BP: (!) 176/60 150/57 134/50   Pulse: 89 76 79         Visual Acuity  Visual Acuity    Flowsheet Row Most Recent Value   L Pupil Size (mm) 3   R Pupil Size (mm) 3          ED Medications  Medications   potassium chloride (K-DUR,KLOR-CON) CR tablet 20 mEq (20 mEq Oral Given 10/24/22 2127)       Diagnostic Studies  Results Reviewed     Procedure Component Value Units Date/Time    HS Troponin I 2hr [[id number]]  (Normal) Collected: 10/24/22 2254    Lab Status: Final result Specimen: Blood from Arm, Right Updated: 10/24/22 2321     hs TnI 2hr 7 ng/L      Delta 2hr hsTnI 0 ng/L     HS Troponin I 4hr [[id number]]     Lab Status: No result Specimen: Blood     HS Troponin 0hr (reflex protocol) [[id number]]  (Normal) Collected: 10/24/22 2055    Lab Status: Final result Specimen: Blood from Arm, Right Updated: 10/24/22 2123     hs TnI 0hr 7 ng/L     Comprehensive metabolic panel [[id number]]  (Abnormal) Collected: 10/24/22 2055    Lab Status: Final result Specimen: Blood from Arm, Right Updated: 10/24/22 2117     Sodium 140 mmol/L      Potassium 3 4 mmol/L      Chloride 102 mmol/L      CO2 29 mmol/L      ANION GAP 9 mmol/L      BUN 23 mg/dL      Creatinine 1 21 mg/dL      Glucose 120 mg/dL      Calcium 9 8 mg/dL      AST 20 U/L      ALT 21 U/L      Alkaline Phosphatase 51 U/L      Total Protein 7 5 g/dL      Albumin 3 9 g/dL      Total Bilirubin 0 27 mg/dL      eGFR 41 ml/min/1 73sq m     Narrative:      Sameer guidelines for Chronic Kidney Disease (CKD):   •  Stage 1 with normal or high GFR (GFR > 90 mL/min/1 73 square meters)  •  Stage 2 Mild CKD (GFR = 60-89 mL/min/1 73 square meters)  •  Stage 3A Moderate CKD (GFR = 45-59 mL/min/1 73 square meters)  •  Stage 3B Moderate CKD (GFR = 30-44 mL/min/1 73 square meters)  •  Stage 4 Severe CKD (GFR = 15-29 mL/min/1 73 square meters)  •  Stage 5 End Stage CKD (GFR <15 mL/min/1 73 square meters)  Note: GFR calculation is accurate only with a steady state creatinine    CBC and differential [[id number]]  (Abnormal) Collected: 10/24/22 2055    Lab Status: Final result Specimen: Blood from Arm, Right Updated: 10/24/22 2100     WBC 10 30 Thousand/uL      RBC 4 45 Million/uL      Hemoglobin 13 5 g/dL      Hematocrit 40 9 %      MCV 92 fL      MCH 30 3 pg      MCHC 33 0 g/dL      RDW 13 1 %      MPV 11 5 fL      Platelets 209 Thousands/uL      nRBC 0 /100 WBCs      Neutrophils Relative 54 %      Immat GRANS % 0 %      Lymphocytes Relative 33 %      Monocytes Relative 10 %      Eosinophils Relative 2 %      Basophils Relative 1 %      Neutrophils Absolute 5 53 Thousands/µL      Immature Grans Absolute 0 03 Thousand/uL      Lymphocytes Absolute 3 44 Thousands/µL      Monocytes Absolute 1 02 Thousand/µL      Eosinophils Absolute 0 21 Thousand/µL      Basophils Absolute 0 07 Thousands/µL                  CT head without contrast   Final Result by Oanh Hadley MD (10/24 2225)      No intracranial hemorrhage or calvarial fracture  Workstation performed: [id number]                    Procedures  Procedures         ED Course  ED Course as of 10/24/22 2344   Mon Oct 24, 2022   2100 EKG: SR at 80 with RBBB, normal ST-t, Qtc 475   2323 On reexamination, patient continues abnormal gait  Her neuro exam remains intact without nystagmus and without ataxia  I discussed red flags with patient that should prompt return to the emergency department  Patient verbalized understanding and agreed                                   SBIRT 22yo+    Flowsheet Row Most Recent Value   SBIRT (23 yo +)    In order to provide better care to our patients, we are screening all of our patients for alcohol and drug use  Would it be okay to ask you these screening questions? No Filed at: 10/24/2022 2050                    [facility]  Number of Diagnoses or Management Options  Diagnosis management comments: I evaluated the patient  She has no nystagmus at rest   Was also not able to elicit any nystagmus with [place]-Hallpike maneuver  Nevertheless, patient has normal neuro exam without ataxia and had abrupt dizziness after bending forward for 30 minutes  History and exam more consistent with positional vertigo  Will nevertheless order labs and CT due to age  Amount and/or Complexity of Data Reviewed  Clinical lab tests: ordered and reviewed  Tests in the radiology section of CPT®: ordered and reviewed        Disposition  Final diagnoses:   Dizziness     Time reflects when diagnosis was documented in both MDM as applicable and the Disposition within this note     Time User Action Codes Description Comment    10/24/2022 11:24 PM Sheila Client Add [R42] Dizziness       ED Disposition     ED Disposition   Discharge    Condition   Stable    Date/Time   Mon Oct 24, 2022 11:24 PM    Oniel 48 discharge to home/self care                 Follow-up Information     Follow up With Specialties Details Why Roberth Malloy MD Internal Medicine In 3 days  [address]  [phone]            Discharge Medication List as of 10/24/2022 11:25 PM      CONTINUE these medications which have NOT CHANGED    Details   candesartan-hydrochlorothiazide (ATACAND HCT) 32-12 5 MG per tablet take 1 tablet by mouth daily, Historical Med      LORazepam (ATIVAN) 0 5 mg tablet Take 0 5 mg by mouth daily as needed, Starting Mon 4/5/2021, Historical Med      Trusopt 2 % ophthalmic solution instill 1 drop into both eyes twice a day, Historical Med      valACYclovir (VALTREX) 1,000 mg tablet Take 1 tablet (1,000 mg total) by mouth 3 (three) times a day for 7 days, Starting Sat 11/21/2020, Until Sat 11/28/2020, Print      Xalatan 0 005 % ophthalmic solution place 1 drop into both eyes at bedtime, Historical Med             No discharge procedures on file      PDMP Review     None          ED Provider  Electronically Signed by           Rambo Zamorano MD  10/24/22 6175

## 2022-10-27 LAB
ATRIAL RATE: 84 BPM
P AXIS: 52 DEGREES
PR INTERVAL: 110 MS
QRS AXIS: -14 DEGREES
QRSD INTERVAL: 124 MS
QT INTERVAL: 404 MS
QTC INTERVAL: 475 MS
T WAVE AXIS: 52 DEGREES
VENTRICULAR RATE: 83 BPM

## 2022-10-27 PROCEDURE — 93010 ELECTROCARDIOGRAM REPORT: CPT | Performed by: INTERNAL MEDICINE

## 2023-06-03 ENCOUNTER — HOSPITAL ENCOUNTER (EMERGENCY)
Facility: HOSPITAL | Age: 83
Discharge: HOME | End: 2023-06-03
Attending: EMERGENCY MEDICINE
Payer: COMMERCIAL

## 2023-06-03 ENCOUNTER — APPOINTMENT (EMERGENCY)
Dept: RADIOLOGY | Facility: HOSPITAL | Age: 83
End: 2023-06-03
Payer: COMMERCIAL

## 2023-06-03 VITALS
TEMPERATURE: 97.6 F | DIASTOLIC BLOOD PRESSURE: 65 MMHG | SYSTOLIC BLOOD PRESSURE: 152 MMHG | OXYGEN SATURATION: 99 % | RESPIRATION RATE: 16 BRPM | HEART RATE: 89 BPM

## 2023-06-03 DIAGNOSIS — J06.9 VIRAL URI WITH COUGH: Primary | ICD-10-CM

## 2023-06-03 DIAGNOSIS — R53.83 FATIGUE: ICD-10-CM

## 2023-06-03 LAB
ALBUMIN SERPL BCP-MCNC: 4.1 G/DL (ref 3.5–5)
ALP SERPL-CCNC: 40 U/L (ref 34–104)
ALT SERPL W P-5'-P-CCNC: 19 U/L (ref 7–52)
ANION GAP SERPL CALCULATED.3IONS-SCNC: 9 MMOL/L (ref 4–13)
AST SERPL W P-5'-P-CCNC: 19 U/L (ref 13–39)
BACTERIA UR QL AUTO: ABNORMAL /HPF
BASOPHILS # BLD AUTO: 0.06 THOUSANDS/ÂΜL (ref 0–0.1)
BASOPHILS NFR BLD AUTO: 1 % (ref 0–1)
BILIRUB SERPL-MCNC: 0.52 MG/DL (ref 0.2–1)
BILIRUB UR QL STRIP: NEGATIVE
BUN SERPL-MCNC: 26 MG/DL (ref 5–25)
CALCIUM SERPL-MCNC: 9.6 MG/DL (ref 8.4–10.2)
CHLORIDE SERPL-SCNC: 106 MMOL/L (ref 96–108)
CLARITY UR: CLEAR
CO2 SERPL-SCNC: 24 MMOL/L (ref 21–32)
COLOR UR: YELLOW
CREAT SERPL-MCNC: 1.2 MG/DL (ref 0.6–1.3)
EOSINOPHIL # BLD AUTO: 0.18 THOUSAND/ÂΜL (ref 0–0.61)
EOSINOPHIL NFR BLD AUTO: 2 % (ref 0–6)
ERYTHROCYTE [DISTWIDTH] IN BLOOD BY AUTOMATED COUNT: 12.9 % (ref 11.6–15.1)
FLUAV RNA RESP QL NAA+PROBE: NEGATIVE
FLUBV RNA RESP QL NAA+PROBE: NEGATIVE
GFR SERPL CREATININE-BSD FRML MDRD: 41 ML/MIN/1.73SQ M
GLUCOSE SERPL-MCNC: 124 MG/DL (ref 65–140)
GLUCOSE UR STRIP-MCNC: NEGATIVE MG/DL
HCT VFR BLD AUTO: 41.5 % (ref 34.8–46.1)
HGB BLD-MCNC: 13.7 G/DL (ref 11.5–15.4)
HGB UR QL STRIP.AUTO: NEGATIVE
IMM GRANULOCYTES # BLD AUTO: 0.04 THOUSAND/UL (ref 0–0.2)
IMM GRANULOCYTES NFR BLD AUTO: 1 % (ref 0–2)
KETONES UR STRIP-MCNC: NEGATIVE MG/DL
LEUKOCYTE ESTERASE UR QL STRIP: ABNORMAL
LYMPHOCYTES # BLD AUTO: 2.54 THOUSANDS/ÂΜL (ref 0.6–4.47)
LYMPHOCYTES NFR BLD AUTO: 30 % (ref 14–44)
MCH RBC QN AUTO: 30 PG (ref 26.8–34.3)
MCHC RBC AUTO-ENTMCNC: 33 G/DL (ref 31.4–37.4)
MCV RBC AUTO: 91 FL (ref 82–98)
MONOCYTES # BLD AUTO: 0.73 THOUSAND/ÂΜL (ref 0.17–1.22)
MONOCYTES NFR BLD AUTO: 9 % (ref 4–12)
NEUTROPHILS # BLD AUTO: 4.92 THOUSANDS/ÂΜL (ref 1.85–7.62)
NEUTS SEG NFR BLD AUTO: 57 % (ref 43–75)
NITRITE UR QL STRIP: NEGATIVE
NON-SQ EPI CELLS URNS QL MICRO: ABNORMAL /HPF
NRBC BLD AUTO-RTO: 0 /100 WBCS
OTHER STN SPEC: ABNORMAL
PH UR STRIP.AUTO: 5.5 [PH]
PLATELET # BLD AUTO: 239 THOUSANDS/UL (ref 149–390)
PMV BLD AUTO: 12.1 FL (ref 8.9–12.7)
POTASSIUM SERPL-SCNC: 3.6 MMOL/L (ref 3.5–5.3)
PROT SERPL-MCNC: 7.8 G/DL (ref 6.4–8.4)
PROT UR STRIP-MCNC: NEGATIVE MG/DL
RBC # BLD AUTO: 4.57 MILLION/UL (ref 3.81–5.12)
RBC #/AREA URNS AUTO: ABNORMAL /HPF
RSV RNA RESP QL NAA+PROBE: NEGATIVE
SARS-COV-2 RNA RESP QL NAA+PROBE: NEGATIVE
SODIUM SERPL-SCNC: 139 MMOL/L (ref 135–147)
SP GR UR STRIP.AUTO: 1.01 (ref 1–1.03)
TSH SERPL DL<=0.05 MIU/L-ACNC: 2.97 UIU/ML (ref 0.45–4.5)
UROBILINOGEN UR QL STRIP.AUTO: 0.2 E.U./DL
WBC # BLD AUTO: 8.47 THOUSAND/UL (ref 4.31–10.16)
WBC #/AREA URNS AUTO: ABNORMAL /HPF

## 2023-06-03 PROCEDURE — 85025 COMPLETE CBC W/AUTO DIFF WBC: CPT | Performed by: PHYSICIAN ASSISTANT

## 2023-06-03 PROCEDURE — 96360 HYDRATION IV INFUSION INIT: CPT

## 2023-06-03 PROCEDURE — 36415 COLL VENOUS BLD VENIPUNCTURE: CPT | Performed by: PHYSICIAN ASSISTANT

## 2023-06-03 PROCEDURE — 96361 HYDRATE IV INFUSION ADD-ON: CPT

## 2023-06-03 PROCEDURE — 84443 ASSAY THYROID STIM HORMONE: CPT | Performed by: PHYSICIAN ASSISTANT

## 2023-06-03 PROCEDURE — 71046 X-RAY EXAM CHEST 2 VIEWS: CPT

## 2023-06-03 PROCEDURE — 0241U HB NFCT DS VIR RESP RNA 4 TRGT: CPT | Performed by: PHYSICIAN ASSISTANT

## 2023-06-03 PROCEDURE — 81001 URINALYSIS AUTO W/SCOPE: CPT | Performed by: PHYSICIAN ASSISTANT

## 2023-06-03 PROCEDURE — 81003 URINALYSIS AUTO W/O SCOPE: CPT | Performed by: PHYSICIAN ASSISTANT

## 2023-06-03 PROCEDURE — 99283 EMERGENCY DEPT VISIT LOW MDM: CPT

## 2023-06-03 PROCEDURE — 80053 COMPREHEN METABOLIC PANEL: CPT | Performed by: PHYSICIAN ASSISTANT

## 2023-06-03 RX ADMIN — SODIUM CHLORIDE 1000 ML: 0.9 INJECTION, SOLUTION INTRAVENOUS at 09:53

## 2023-06-03 NOTE — ED PROVIDER NOTES
"History  Chief Complaint   Patient presents with   • Cough     PT presents to ED from home w/ cough and congestion for two weeks  Unable to see to PCP, saw other doctor in practice and \"didn't do much\"  Pt (+) HTN  Past Medical History: Anxiety, Arthritis, Chronic kidney disease, GERD, Glaucoma, HTN, IBS, Renal disorder   Past Surgical History: APPENDECTOMY, CHOLECYSTECTOMY, Right PATELLAR TENDON REPAIR, TUBAL LIGATION      Pt presents to ED c/o 2 weeks of persistent cough, dry at first, then productive of yellow, now whitish phlegm/congestion/PND and generalized fatigue  No fevers, myalgias, sinus pressure, headache, ear/eye pain, sore throat, chest pain, sob, abd pain, N/V/D, or urinary symptoms, LE edema/swelling  Pt using robitussin prn cough, but sx persist    Pt seen by PCP in past, finished course of Pamela Natali about 1 week ago with no change in sx; rx for steroid nose spray, but pt refuses to use, doesn't like to take many meds bc \"gets side effects/reactions from them\"  Pt most concerned about generalized fatigue that brought her to ED              Prior to Admission Medications   Prescriptions Last Dose Informant Patient Reported? Taking?    LORazepam (ATIVAN) 0 5 mg tablet   Yes No   Sig: Take 0 5 mg by mouth daily as needed   Trusopt 2 % ophthalmic solution   Yes No   Sig: instill 1 drop into both eyes twice a day   Patient not taking: Reported on 10/10/2022   Xalatan 0 005 % ophthalmic solution   Yes No   Sig: place 1 drop into both eyes at bedtime   candesartan-hydrochlorothiazide (ATACAND HCT) 32-12 5 MG per tablet   Yes No   Sig: take 1 tablet by mouth daily   Patient not taking: Reported on 10/10/2022   potassium chloride (Klor-Con) 10 mEq tablet   Yes No   Sig: Take 10 mEq by mouth daily   valACYclovir (VALTREX) 1,000 mg tablet   No No   Sig: Take 1 tablet (1,000 mg total) by mouth 3 (three) times a day for 7 days      Facility-Administered Medications: None       Past Medical History:   Diagnosis " Date   • Anxiety    • Arthritis    • Chronic kidney disease (CKD)    • GERD (gastroesophageal reflux disease)    • Glaucoma    • Hypertension    • IBS (irritable bowel syndrome)    • Renal disorder        Past Surgical History:   Procedure Laterality Date   • APPENDECTOMY     • CHOLECYSTECTOMY     • PATELLAR TENDON REPAIR Right    • TUBAL LIGATION         History reviewed  No pertinent family history  I have reviewed and agree with the history as documented  E-Cigarette/Vaping   • E-Cigarette Use Never User      E-Cigarette/Vaping Substances   • Nicotine No    • THC No    • CBD No    • Flavoring No    • Other No    • Unknown No      Social History     Tobacco Use   • Smoking status: Never   • Smokeless tobacco: Never   Vaping Use   • Vaping Use: Never used   Substance Use Topics   • Alcohol use: Never   • Drug use: Not Currently       Review of Systems   Constitutional: Positive for fatigue  Negative for chills and fever  HENT: Positive for postnasal drip  Negative for congestion, ear pain, rhinorrhea, sinus pressure, sore throat and trouble swallowing  Eyes: Negative for itching  Respiratory: Positive for cough  Negative for shortness of breath  Cardiovascular: Negative for chest pain  Gastrointestinal: Negative for abdominal pain, diarrhea, nausea and vomiting  Genitourinary: Negative for difficulty urinating, dysuria, hematuria and vaginal bleeding  Musculoskeletal: Negative for myalgias  Skin: Negative for rash  Neurological: Negative for weakness and headaches  All other systems reviewed and are negative  Physical Exam  Physical Exam  Vitals and nursing note reviewed  Constitutional:       General: She is not in acute distress  Appearance: Normal appearance  She is well-developed  She is not ill-appearing, toxic-appearing or diaphoretic  HENT:      Head: Normocephalic and atraumatic        Right Ear: Hearing, tympanic membrane and external ear normal       Left Ear: Hearing, tympanic membrane and external ear normal       Ears:      Comments: Cerumen in B/L canals greatest on left     Nose: Nose normal  No congestion or rhinorrhea  Mouth/Throat:      Mouth: Mucous membranes are moist       Pharynx: Oropharynx is clear  No oropharyngeal exudate or posterior oropharyngeal erythema  Eyes:      General:         Right eye: No discharge  Left eye: No discharge  Conjunctiva/sclera: Conjunctivae normal    Cardiovascular:      Rate and Rhythm: Normal rate and regular rhythm  Heart sounds: Normal heart sounds  Pulmonary:      Effort: Pulmonary effort is normal  No respiratory distress  Breath sounds: Normal breath sounds  No stridor  No wheezing, rhonchi or rales  Comments: Rare, coarse, inflammatory cough  Abdominal:      Palpations: Abdomen is soft  Tenderness: There is no abdominal tenderness  There is no guarding  Musculoskeletal:         General: Normal range of motion  Cervical back: Normal range of motion  Skin:     General: Skin is warm and dry  Findings: No rash  Neurological:      General: No focal deficit present  Mental Status: She is alert     Psychiatric:         Behavior: Behavior normal          Vital Signs  ED Triage Vitals [06/03/23 0858]   Temperature Pulse Respirations Blood Pressure SpO2   97 6 °F (36 4 °C) 89 16 152/65 99 %      Temp src Heart Rate Source Patient Position - Orthostatic VS BP Location FiO2 (%)   -- -- -- -- --      Pain Score       --           Vitals:    06/03/23 0858   BP: 152/65   Pulse: 89         Visual Acuity      ED Medications  Medications   sodium chloride 0 9 % bolus 1,000 mL (0 mL Intravenous Stopped 6/3/23 1220)       Diagnostic Studies  Results Reviewed     Procedure Component Value Units Date/Time    Urine Microscopic [[id number]]  (Abnormal) Collected: 06/03/23 1124    Lab Status: Final result Specimen: Urine, Clean Catch Updated: 06/03/23 1155     RBC, UA None Seen /hpf WBC, UA 0-5 /hpf      Epithelial Cells Moderate /hpf      Bacteria, UA Occasional /hpf      OTHER OBSERVATIONS Transitional Epithelial Cells    UA w Reflex to Microscopic w Reflex to Culture [[id number]]  (Abnormal) Collected: 06/03/23 1124    Lab Status: Final result Specimen: Urine, Clean Catch Updated: 06/03/23 1137     Color, UA Yellow     Clarity, UA Clear     Specific Gravity, UA 1 015     pH, UA 5 5     Leukocytes, UA 1+     Nitrite, UA Negative     Protein, UA Negative mg/dl      Glucose, UA Negative mg/dl      Ketones, UA Negative mg/dl      Urobilinogen, UA 0 2 E U /dl      Bilirubin, UA Negative     Occult Blood, UA Negative    FLU/RSV/COVID - if FLU/RSV clinically relevant [[id number]]  (Normal) Collected: 06/03/23 0951    Lab Status: Final result Specimen: Nares from Nose Updated: 06/03/23 1039     SARS-CoV-2 Negative     INFLUENZA A PCR Negative     INFLUENZA B PCR Negative     RSV PCR Negative    Narrative:      FOR PEDIATRIC PATIENTS - copy/paste COVID Guidelines URL to browser: [email or web address]  [employer]    SARS-CoV-2 assay is a Nucleic Acid Amplification assay intended for the  qualitative detection of nucleic acid from SARS-CoV-2 in nasopharyngeal  swabs  Results are for the presumptive identification of SARS-CoV-2 RNA  Positive results are indicative of infection with SARS-CoV-2, the virus  causing COVID-19, but do not rule out bacterial infection or co-infection  with other viruses  Laboratories within the [place] and its  territories are required to report all positive results to the appropriate  public health authorities  Negative results do not preclude SARS-CoV-2  infection and should not be used as the sole basis for treatment or other  patient management decisions  Negative results must be combined with  clinical observations, patient history, and epidemiological information  This test has not been FDA cleared or approved      This test has been authorized by FDA under an Emergency Use Authorization  (EUA)  This test is only authorized for the duration of time the  declaration that circumstances exist justifying the authorization of the  emergency use of an in vitro diagnostic tests for detection of SARS-CoV-2  virus and/or diagnosis of COVID-19 infection under section 564(b)(1) of  the Act, 21 U  S C  227ILY-5(F)(8), unless the authorization is terminated  or revoked sooner  The test has been validated but independent review by FDA  and CLIA is pending  Test performed using [employer] GeneXpert: This RT-PCR assay targets N2,  a region unique to SARS-CoV-2  A conserved region in the E-gene was chosen  for pan-Sarbecovirus detection which includes SARS-CoV-2  According to CMS-2020-01-R, this platform meets the definition of high-throughput technology      TSH [[id number]]  (Normal) Collected: 06/03/23 0951    Lab Status: Final result Specimen: Blood from Arm, Right Updated: 06/03/23 1033     TSH 3RD GENERATON 2 969 uIU/mL     Comprehensive metabolic panel [[id number]]  (Abnormal) Collected: 06/03/23 0951    Lab Status: Final result Specimen: Blood from Arm, Right Updated: 06/03/23 1017     Sodium 139 mmol/L      Potassium 3 6 mmol/L      Chloride 106 mmol/L      CO2 24 mmol/L      ANION GAP 9 mmol/L      BUN 26 mg/dL      Creatinine 1 20 mg/dL      Glucose 124 mg/dL      Calcium 9 6 mg/dL      AST 19 U/L      ALT 19 U/L      Alkaline Phosphatase 40 U/L      Total Protein 7 8 g/dL      Albumin 4 1 g/dL      Total Bilirubin 0 52 mg/dL      eGFR 41 ml/min/1 73sq m     Narrative:      Sameer guidelines for Chronic Kidney Disease (CKD):   •  Stage 1 with normal or high GFR (GFR > 90 mL/min/1 73 square meters)  •  Stage 2 Mild CKD (GFR = 60-89 mL/min/1 73 square meters)  •  Stage 3A Moderate CKD (GFR = 45-59 mL/min/1 73 square meters)  •  Stage 3B Moderate CKD (GFR = 30-44 mL/min/1 73 square meters)  •  Stage 4 Severe CKD (GFR = 15-29 mL/min/1 73 square meters)  •  Stage 5 End Stage CKD (GFR <15 mL/min/1 73 square meters)  Note: GFR calculation is accurate only with a steady state creatinine    CBC and differential [[id number]] Collected: 06/03/23 0951    Lab Status: Final result Specimen: Blood from Arm, Right Updated: 06/03/23 1000     WBC 8 47 Thousand/uL      RBC 4 57 Million/uL      Hemoglobin 13 7 g/dL      Hematocrit 41 5 %      MCV 91 fL      MCH 30 0 pg      MCHC 33 0 g/dL      RDW 12 9 %      MPV 12 1 fL      Platelets 982 Thousands/uL      nRBC 0 /100 WBCs      Neutrophils Relative 57 %      Immat GRANS % 1 %      Lymphocytes Relative 30 %      Monocytes Relative 9 %      Eosinophils Relative 2 %      Basophils Relative 1 %      Neutrophils Absolute 4 92 Thousands/µL      Immature Grans Absolute 0 04 Thousand/uL      Lymphocytes Absolute 2 54 Thousands/µL      Monocytes Absolute 0 73 Thousand/µL      Eosinophils Absolute 0 18 Thousand/µL      Basophils Absolute 0 06 Thousands/µL                  XR chest 2 views   ED Interpretation by Дмитрий Grove PA-C (06/03 1044)   No acute disease, no ptx                 Procedures  Procedures         ED Course  ED Course as of 06/03/23 1240   Sat Jun 03, 2023   1043 Viral panel negative   1043 TSH 3RD GENERATON: 2 969  normal   1157 Leukocytes, UA(!): 1+   1157 Nitrite, UA: Negative   1157 RBC, UA: None Seen   1157 WBC, UA: 0-5   1157 Epithelial Cells(!): Moderate  Likely contaminant, no uti                               SBIRT 22yo+    Flowsheet Row Most Recent Value   Initial Alcohol Screen: US AUDIT-C     1  How often do you have a drink containing alcohol? 0 Filed at: 06/03/2023 0926   2  How many drinks containing alcohol do you have on a typical day you are drinking? 0 Filed at: 06/03/2023 0926   3a  Male UNDER 65: How often do you have five or more drinks on one occasion? 0 Filed at: 06/03/2023 0926   3b  FEMALE Any Age, or MALE 65+:  How often do you have 4 or more drinks on one occassion? 0 Filed at: 06/03/2023 0926   Audit-C Score 0 Filed at: 06/03/2023 3082   PORSCHE: How many times in the past year have you    Used an illegal drug or used a prescription medication for non-medical reasons? Never Filed at: 06/03/2023 7776                    Medical Decision Making  Pt presents with 2 weeks of persistent cough, generalized fatigue  DDX: Consider viral illness, electrolyte abnormality, dehydration, thyroid disorder, bronchitis, pneumonia, lung nodule, mass, among other considerations  Offered steroids to pt in ED, pt refusing, states she doesn't like to take ANY medications   eval in ED did not reveal any emergent condition to explain fatigue, cough is likely post viral inflammtory; Pt does not want any more medication in ED  PT reassured, to FU with PCP    Amount and/or Complexity of Data Reviewed  External Data Reviewed: notes  Labs: ordered  Decision-making details documented in ED Course  Radiology: ordered and independent interpretation performed  Decision-making details documented in ED Course  Risk  OTC drugs  Decision regarding hospitalization  Disposition  Final diagnoses:   Viral URI with cough   Fatigue     Time reflects when diagnosis was documented in both MDM as applicable and the Disposition within this note     Time User Action Codes Description Comment    6/3/2023 12:18 PM Parker Flor Add [J06 9] Viral URI with cough     6/3/2023 12:18 PM Parker Flor Add [R53 83] Fatigue       ED Disposition     ED Disposition   Discharge    Condition   Stable    Date/Time   Sat Dinh 3, 2023 12:18 PM    Comment   Isidoro Clemente discharge to home/self care                 Follow-up Information     Follow up With Specialties Details Why Contact Info    Carol Hernandez MD Internal Medicine  As needed [address] [place] [address]             Discharge Medication List as of 6/3/2023 12:19 PM      CONTINUE these medications which have NOT CHANGED    Details   candesartan-hydrochlorothiazide (ATACAND HCT) 32-12 5 MG per tablet take 1 tablet by mouth daily, Historical Med      LORazepam (ATIVAN) 0 5 mg tablet Take 0 5 mg by mouth daily as needed, Starting Mon 4/5/2021, Historical Med      potassium chloride (Klor-Con) 10 mEq tablet Take 10 mEq by mouth daily, Starting Mon 4/10/2023, Historical Med      Trusopt 2 % ophthalmic solution instill 1 drop into both eyes twice a day, Historical Med      valACYclovir (VALTREX) 1,000 mg tablet Take 1 tablet (1,000 mg total) by mouth 3 (three) times a day for 7 days, Starting Sat 11/21/2020, Until Sat 11/28/2020, Print      Xalatan 0 005 % ophthalmic solution place 1 drop into both eyes at bedtime, Historical Med             No discharge procedures on file      PDMP Review     None          ED Provider  Electronically Signed by           Дмитрий Grove PA-C  06/03/23 4921

## 2023-06-03 NOTE — DISCHARGE INSTRUCTIONS
Eat small meals throughout the day and drink plenty of fluids    Get rest  Follow-up with PCP as needed

## 2023-10-02 ENCOUNTER — HOSPITAL ENCOUNTER (EMERGENCY)
Facility: HOSPITAL | Age: 83
Discharge: HOME | End: 2023-10-03
Attending: EMERGENCY MEDICINE
Payer: COMMERCIAL

## 2023-10-02 VITALS
RESPIRATION RATE: 16 BRPM | SYSTOLIC BLOOD PRESSURE: 157 MMHG | DIASTOLIC BLOOD PRESSURE: 73 MMHG | TEMPERATURE: 98.3 F | OXYGEN SATURATION: 97 % | HEART RATE: 89 BPM

## 2023-10-02 DIAGNOSIS — K57.92 DIVERTICULITIS: Primary | ICD-10-CM

## 2023-10-02 DIAGNOSIS — R42 LIGHTHEADEDNESS: ICD-10-CM

## 2023-10-02 LAB
ALBUMIN SERPL BCP-MCNC: 4.1 G/DL (ref 3.5–5)
ALP SERPL-CCNC: 46 U/L (ref 34–104)
ALT SERPL W P-5'-P-CCNC: 16 U/L (ref 7–52)
ANION GAP SERPL CALCULATED.3IONS-SCNC: 11 MMOL/L
AST SERPL W P-5'-P-CCNC: 25 U/L (ref 13–39)
BASOPHILS # BLD AUTO: 0.05 THOUSANDS/ÂΜL (ref 0–0.1)
BASOPHILS NFR BLD AUTO: 1 % (ref 0–1)
BILIRUB SERPL-MCNC: 0.36 MG/DL (ref 0.2–1)
BUN SERPL-MCNC: 26 MG/DL (ref 5–25)
CALCIUM SERPL-MCNC: 9.3 MG/DL (ref 8.4–10.2)
CHLORIDE SERPL-SCNC: 103 MMOL/L (ref 96–108)
CO2 SERPL-SCNC: 23 MMOL/L (ref 21–32)
CREAT SERPL-MCNC: 1.17 MG/DL (ref 0.6–1.3)
EOSINOPHIL # BLD AUTO: 0.29 THOUSAND/ÂΜL (ref 0–0.61)
EOSINOPHIL NFR BLD AUTO: 3 % (ref 0–6)
ERYTHROCYTE [DISTWIDTH] IN BLOOD BY AUTOMATED COUNT: 13 % (ref 11.6–15.1)
GFR SERPL CREATININE-BSD FRML MDRD: 42 ML/MIN/1.73SQ M
GLUCOSE SERPL-MCNC: 113 MG/DL (ref 65–140)
HCT VFR BLD AUTO: 38.4 % (ref 34.8–46.1)
HGB BLD-MCNC: 12.6 G/DL (ref 11.5–15.4)
IMM GRANULOCYTES # BLD AUTO: 0.02 THOUSAND/UL (ref 0–0.2)
IMM GRANULOCYTES NFR BLD AUTO: 0 % (ref 0–2)
LIPASE SERPL-CCNC: 39 U/L (ref 11–82)
LYMPHOCYTES # BLD AUTO: 2.67 THOUSANDS/ÂΜL (ref 0.6–4.47)
LYMPHOCYTES NFR BLD AUTO: 28 % (ref 14–44)
MAGNESIUM SERPL-MCNC: 2.1 MG/DL (ref 1.9–2.7)
MCH RBC QN AUTO: 30.1 PG (ref 26.8–34.3)
MCHC RBC AUTO-ENTMCNC: 32.8 G/DL (ref 31.4–37.4)
MCV RBC AUTO: 92 FL (ref 82–98)
MONOCYTES # BLD AUTO: 0.98 THOUSAND/ÂΜL (ref 0.17–1.22)
MONOCYTES NFR BLD AUTO: 10 % (ref 4–12)
NEUTROPHILS # BLD AUTO: 5.5 THOUSANDS/ÂΜL (ref 1.85–7.62)
NEUTS SEG NFR BLD AUTO: 58 % (ref 43–75)
NRBC BLD AUTO-RTO: 0 /100 WBCS
PLATELET # BLD AUTO: 192 THOUSANDS/UL (ref 149–390)
PMV BLD AUTO: 11.6 FL (ref 8.9–12.7)
POTASSIUM SERPL-SCNC: 4.2 MMOL/L (ref 3.5–5.3)
PROT SERPL-MCNC: 7.4 G/DL (ref 6.4–8.4)
RBC # BLD AUTO: 4.19 MILLION/UL (ref 3.81–5.12)
SODIUM SERPL-SCNC: 137 MMOL/L (ref 135–147)
WBC # BLD AUTO: 9.51 THOUSAND/UL (ref 4.31–10.16)

## 2023-10-02 PROCEDURE — 93005 ELECTROCARDIOGRAM TRACING: CPT

## 2023-10-02 PROCEDURE — 83735 ASSAY OF MAGNESIUM: CPT | Performed by: EMERGENCY MEDICINE

## 2023-10-02 PROCEDURE — 80053 COMPREHEN METABOLIC PANEL: CPT | Performed by: EMERGENCY MEDICINE

## 2023-10-02 PROCEDURE — 99284 EMERGENCY DEPT VISIT MOD MDM: CPT

## 2023-10-02 PROCEDURE — 83690 ASSAY OF LIPASE: CPT | Performed by: EMERGENCY MEDICINE

## 2023-10-02 PROCEDURE — 84484 ASSAY OF TROPONIN QUANT: CPT | Performed by: EMERGENCY MEDICINE

## 2023-10-02 PROCEDURE — 36415 COLL VENOUS BLD VENIPUNCTURE: CPT | Performed by: EMERGENCY MEDICINE

## 2023-10-02 PROCEDURE — 99285 EMERGENCY DEPT VISIT HI MDM: CPT | Performed by: EMERGENCY MEDICINE

## 2023-10-02 PROCEDURE — 85025 COMPLETE CBC W/AUTO DIFF WBC: CPT | Performed by: EMERGENCY MEDICINE

## 2023-10-03 ENCOUNTER — APPOINTMENT (EMERGENCY)
Dept: CT IMAGING | Facility: HOSPITAL | Age: 83
End: 2023-10-03
Payer: COMMERCIAL

## 2023-10-03 LAB — CARDIAC TROPONIN I PNL SERPL HS: 4 NG/L

## 2023-10-03 PROCEDURE — G1004 CDSM NDSC: HCPCS

## 2023-10-03 PROCEDURE — 74177 CT ABD & PELVIS W/CONTRAST: CPT

## 2023-10-03 RX ORDER — AMOXICILLIN AND CLAVULANATE POTASSIUM 875; 125 MG/1; MG/1
1 TABLET, FILM COATED ORAL EVERY 8 HOURS
Qty: 14 TABLET | Refills: 0 | Status: SHIPPED | OUTPATIENT
Start: 2023-10-03 | End: 2023-10-08

## 2023-10-03 RX ORDER — AMOXICILLIN AND CLAVULANATE POTASSIUM 875; 125 MG/1; MG/1
1 TABLET, FILM COATED ORAL ONCE
Status: COMPLETED | OUTPATIENT
Start: 2023-10-03 | End: 2023-10-03

## 2023-10-03 RX ADMIN — IOHEXOL 85 ML: 350 INJECTION, SOLUTION INTRAVENOUS at 00:30

## 2023-10-03 RX ADMIN — AMOXICILLIN AND CLAVULANATE POTASSIUM 1 TABLET: 875; 125 TABLET, FILM COATED ORAL at 01:50

## 2023-10-03 NOTE — ED CARE HANDOFF
Emergency Department Sign Out Note        Sign out and transfer of care from ***. See Separate Emergency Department note. The patient, Colonel Cruz, was evaluated by the previous provider for ***. Workup Completed:  ***    ED Course / Workup Pending (followup):  ***                                  ED Course as of 10/03/23 0201   Tue Oct 03, 2023   0040 SO: 79 y F. Episode of severe abdominal pain yesterday, resolved spontaneously. Now today with lightheadedness. Ambulatory with steady gait. CT pending.   0102 CT abdomen pelvis with contrast  Possible mild/early acute diverticulitis of the proximal descending colon. No evidence of complication. The study was marked in [facility] for immediate notification. 0107 Discussed with patient regarding imaging findings of early uncomplicated diverticulitis on CT. Plan to prescribe oral antibiotic course with first dose given tonight in the ER. Follow-up with primary care provider. Procedures  MDM        Disposition  Final diagnoses:   Lightheadedness   Diverticulitis     Time reflects when diagnosis was documented in both MDM as applicable and the Disposition within this note     Time User Action Codes Description Comment    10/3/2023 12:41 AM Knute Simper Add [R42] Lightheadedness     10/3/2023  1:13 AM Trellis Hoffmeister Add [K57.92] Diverticulitis     10/3/2023  1:13 AM Trellis Hoffmeister Modify [R42] Lightheadedness     10/3/2023  1:13 AM Trellis Anisha Modify [K57.92] Diverticulitis       ED Disposition     ED Disposition   Discharge    Condition   Stable    Date/Time   Tue Oct 3, 2023  1:21 AM    Comment   Colonel Cruz discharge to home/self care.                Follow-up Information     Follow up With Specialties Details Why Contact Info Additional Information    Brian Ronquillo MD Internal Medicine Call in 3 days For follow-up [address]  [facility] [address] [facility] Emergency Department Emergency Medicine Go to  If symptoms worsen [address] [id number] [facility] Emergency Department, [facility] [address]        Discharge Medication List as of 10/3/2023  1:24 AM      START taking these medications    Details   amoxicillin-clavulanate (AUGMENTIN) 875-125 mg per tablet Take 1 tablet by mouth every 8 (eight) hours for 5 days, Starting Tue 10/3/2023, Until Sun 10/8/2023, Normal         CONTINUE these medications which have NOT CHANGED    Details   candesartan-hydrochlorothiazide (ATACAND HCT) 32-12.5 MG per tablet take 1 tablet by mouth daily, Historical Med      LORazepam (ATIVAN) 0.5 mg tablet Take 0.5 mg by mouth daily as needed, Starting Mon 4/5/2021, Historical Med      potassium chloride (Klor-Con) 10 mEq tablet Take 10 mEq by mouth daily, Starting Mon 4/10/2023, Historical Med      Trusopt 2 % ophthalmic solution instill 1 drop into both eyes twice a day, Historical Med      valACYclovir (VALTREX) 1,000 mg tablet Take 1 tablet (1,000 mg total) by mouth 3 (three) times a day for 7 days, Starting Sat 11/21/2020, Until Sat 11/28/2020, Print      Xalatan 0.005 % ophthalmic solution place 1 drop into both eyes at bedtime, Historical Med           No discharge procedures on file.        ED Provider  Electronically Signed by

## 2023-10-03 NOTE — DISCHARGE INSTRUCTIONS
Please take the antibiotic I prescribed as instructed.   Also recommend taking a probiotic while you are on the antibiotic medication, and this can be obtained over-the-counter (examples: Nature's Bounty Probiotic 10, Nature Made Digestive Probiotic, etc.)

## 2023-10-03 NOTE — ED PROVIDER NOTES
History  Chief Complaint   Patient presents with   • Dizziness     Reports she was at a car show yesterday and started with abdominal pain, but that went away. Today she feels lightheaded and can't keep her balance. 79-year-old female presents after she was at a car show yesterday and ate barbecue, reports that on the way home she had a sudden severe abdominal pain described as periumbilical pain that did not radiate, at that time she denies any nausea, vomiting, constipation, diarrhea, reports the pain went away on its own, and today she has noticed that the last few times she is woken up she has felt very dizzy described as lightheaded, the patient reports she feels very unsteady on her feet due to the lightheadedness, but denies any facial droop, headache, no vertiginous symptoms, no change in vision or hearing, no weakness in any extremity, and no chest pain, cough, shortness of breath, nausea, vomiting, constipation or diarrhea today, no dysuria, hematuria, and no other complaints. Prior to Admission Medications   Prescriptions Last Dose Informant Patient Reported? Taking?    LORazepam (ATIVAN) 0.5 mg tablet   Yes No   Sig: Take 0.5 mg by mouth daily as needed   Trusopt 2 % ophthalmic solution   Yes No   Sig: instill 1 drop into both eyes twice a day   Patient not taking: Reported on 10/10/2022   Xalatan 0.005 % ophthalmic solution   Yes No   Sig: place 1 drop into both eyes at bedtime   candesartan-hydrochlorothiazide (ATACAND HCT) 32-12.5 MG per tablet   Yes No   Sig: take 1 tablet by mouth daily   Patient not taking: Reported on 10/10/2022   potassium chloride (Klor-Con) 10 mEq tablet   Yes No   Sig: Take 10 mEq by mouth daily   valACYclovir (VALTREX) 1,000 mg tablet   No No   Sig: Take 1 tablet (1,000 mg total) by mouth 3 (three) times a day for 7 days      Facility-Administered Medications: None       Past Medical History:   Diagnosis Date   • Anxiety    • Arthritis    • Chronic kidney disease (CKD)    • GERD (gastroesophageal reflux disease)    • Glaucoma    • Hypertension    • IBS (irritable bowel syndrome)    • Renal disorder        Past Surgical History:   Procedure Laterality Date   • APPENDECTOMY     • CHOLECYSTECTOMY     • PATELLAR TENDON REPAIR Right    • TUBAL LIGATION         History reviewed. No pertinent family history. I have reviewed and agree with the history as documented. E-Cigarette/Vaping   • E-Cigarette Use Never User      E-Cigarette/Vaping Substances   • Nicotine No    • THC No    • CBD No    • Flavoring No    • Other No    • Unknown No      Social History     Tobacco Use   • Smoking status: Never   • Smokeless tobacco: Never   Vaping Use   • Vaping Use: Never used   Substance Use Topics   • Alcohol use: Never   • Drug use: Not Currently       Review of Systems   Constitutional: Negative for fever. HENT: Negative for congestion. Eyes: Negative for visual disturbance. Respiratory: Negative for cough and shortness of breath. Cardiovascular: Negative for chest pain. Gastrointestinal: Positive for abdominal pain. Negative for constipation, diarrhea, nausea and vomiting. Endocrine: Negative for polyuria. Genitourinary: Negative for dysuria and hematuria. Musculoskeletal: Negative for myalgias. Neurological: Positive for light-headedness. Negative for headaches. Physical Exam  Physical Exam  Vitals and nursing note reviewed. Constitutional:       General: She is not in acute distress. Appearance: Normal appearance. She is well-developed. HENT:      Head: Normocephalic and atraumatic. Eyes:      Extraocular Movements: Extraocular movements intact. Conjunctiva/sclera: Conjunctivae normal.   Cardiovascular:      Rate and Rhythm: Normal rate and regular rhythm. Pulmonary:      Effort: Pulmonary effort is normal. No respiratory distress. Breath sounds: Normal breath sounds. Abdominal:      General: There is no distension.       Palpations: Abdomen is soft. There is no mass. Tenderness: There is no abdominal tenderness. There is no right CVA tenderness, left CVA tenderness or guarding. Musculoskeletal:         General: No swelling. Cervical back: Neck supple. Skin:     General: Skin is warm and dry. Capillary Refill: Capillary refill takes less than 2 seconds. Neurological:      General: No focal deficit present. Mental Status: She is alert and oriented to person, place, and time.    Psychiatric:         Mood and Affect: Mood normal.         Vital Signs  ED Triage Vitals [10/02/23 2214]   Temperature Pulse Respirations Blood Pressure SpO2   98.3 °F (36.8 °C) 89 16 157/73 97 %      Temp Source Heart Rate Source Patient Position - Orthostatic VS BP Location FiO2 (%)   Oral -- Lying Right arm --      Pain Score       No Pain           Vitals:    10/02/23 2214   BP: 157/73   Pulse: 89   Patient Position - Orthostatic VS: Lying         Visual Acuity      ED Medications  Medications   iohexol (OMNIPAQUE) 350 MG/ML injection (SINGLE-DOSE) 100 mL (85 mL Intravenous Given 10/3/23 0030)       Diagnostic Studies  Results Reviewed     Procedure Component Value Units Date/Time    HS Troponin I 2hr [[id number]]     Lab Status: No result Specimen: Blood     HS Troponin I 4hr [[id number]]     Lab Status: No result Specimen: Blood     HS Troponin 0hr (reflex protocol) [[id number]]  (Normal) Collected: 10/02/23 2336    Lab Status: Final result Specimen: Blood from Arm, Right Updated: 10/03/23 0005     hs TnI 0hr 4 ng/L     Comprehensive metabolic panel [[id number]]  (Abnormal) Collected: 10/02/23 2336    Lab Status: Final result Specimen: Blood from Arm, Right Updated: 10/02/23 2358     Sodium 137 mmol/L      Potassium 4.2 mmol/L      Chloride 103 mmol/L      CO2 23 mmol/L      ANION GAP 11 mmol/L      BUN 26 mg/dL      Creatinine 1.17 mg/dL      Glucose 113 mg/dL      Calcium 9.3 mg/dL      AST 25 U/L      ALT 16 U/L      Alkaline Phosphatase 46 U/L      Total Protein 7.4 g/dL      Albumin 4.1 g/dL      Total Bilirubin 0.36 mg/dL      eGFR 42 ml/min/1.73sq m     Narrative:      National Kid[employer] Foundation guidelines for Chronic Kidney Disease (CKD):   •  Stage 1 with normal or high GFR (GFR > 90 mL/min/1.73 square meters)  •  Stage 2 Mild CKD (GFR = 60-89 mL/min/1.73 square meters)  •  Stage 3A Moderate CKD (GFR = 45-59 mL/min/1.73 square meters)  •  Stage 3B Moderate CKD (GFR = 30-44 mL/min/1.73 square meters)  •  Stage 4 Severe CKD (GFR = 15-29 mL/min/1.73 square meters)  •  Stage 5 End Stage CKD (GFR <15 mL/min/1.73 square meters)  Note: GFR calculation is accurate only with a steady state creatinine    Lipase [[id number]]  (Normal) Collected: 10/02/23 2336    Lab Status: Final result Specimen: Blood from Arm, Right Updated: 10/02/23 2358     Lipase 39 u/L     Magnesium [[id number]]  (Normal) Collected: 10/02/23 2336    Lab Status: Final result Specimen: Blood from Arm, Right Updated: 10/02/23 2358     Magnesium 2.1 mg/dL     CBC and differential [[id number]] Collected: 10/02/23 2336    Lab Status: Final result Specimen: Blood from Arm, Right Updated: 10/02/23 2341     WBC 9.51 Thousand/uL      RBC 4.19 Million/uL      Hemoglobin 12.6 g/dL      Hematocrit 38.4 %      MCV 92 fL      MCH 30.1 pg      MCHC 32.8 g/dL      RDW 13.0 %      MPV 11.6 fL      Platelets 041 Thousands/uL      nRBC 0 /100 WBCs      Neutrophils Relative 58 %      Immat GRANS % 0 %      Lymphocytes Relative 28 %      Monocytes Relative 10 %      Eosinophils Relative 3 %      Basophils Relative 1 %      Neutrophils Absolute 5.50 Thousands/µL      Immature Grans Absolute 0.02 Thousand/uL      Lymphocytes Absolute 2.67 Thousands/µL      Monocytes Absolute 0.98 Thousand/µL      Eosinophils Absolute 0.29 Thousand/µL      Basophils Absolute 0.05 Thousands/µL     UA (URINE) with reflex to Scope [[id number]]     Lab Status: No result Specimen: Urine                  CT abdomen pelvis with contrast    (Results Pending)              Procedures  ECG 12 Lead Documentation Only    Date/Time: 10/2/2023 11:48 PM    Performed by: Donny Kong MD  Authorized by: Donny Kong MD    ECG reviewed by me, the ED Provider: yes    Patient location:  ED  Previous ECG:     Previous ECG:  Compared to current    Similarity:  No change  Interpretation:     Interpretation: abnormal    Rate:     ECG rate:  83    ECG rate assessment: normal    Rhythm:     Rhythm: sinus rhythm    Ectopy:     Ectopy: none    QRS:     QRS axis:  Normal    QRS intervals: Wide  Conduction:     Conduction: abnormal      Abnormal conduction: complete RBBB    ST segments:     ST segments:  Normal  T waves:     T waves: normal               ED Course       SBIRT 22yo+    Flowsheet Row Most Recent Value   Initial Alcohol Screen: US AUDIT-C     1. How often do you have a drink containing alcohol? 0 Filed at: 10/02/2023 2213   2. How many drinks containing alcohol do you have on a typical day you are drinking? 0 Filed at: 10/02/2023 2213   3b. FEMALE Any Age, or MALE 65+: How often do you have 4 or more drinks on one occassion? 0 Filed at: 10/02/2023 2213   Audit-C Score 0 Filed at: 10/02/2023 2213   PORSCHE: How many times in the past year have you. .. Used an illegal drug or used a prescription medication for non-medical reasons? Never Filed at: 10/02/2023 2213          Medical Decision Making  59-year-old female with dizziness described as lightheadedness, and severe abdominal pain yesterday, will be evaluated for ACS, electrolyte abnormalities, anemia, dehydration, as well as intra-abdominal pathology such as appendicitis, pancreatitis, nephrolithiasis, UTI, or other abnormalities. Patient care transferred to Dr. Caity Siegel for further management, pending CT read and reevaluation. Amount and/or Complexity of Data Reviewed  Labs: ordered. Radiology: ordered.       Risk  Prescription drug management. Disposition  Final diagnoses:   Lightheadedness     Time reflects when diagnosis was documented in both MDM as applicable and the Disposition within this note     Time User Action Codes Description Comment    10/3/2023 12:41 AM Geovanny Pérez       ED Disposition     None      Follow-up Information    None         Patient's Medications   Discharge Prescriptions    No medications on file       No discharge procedures on file.     PDMP Review     None          ED Provider  Electronically Signed by           Ino Lockwood MD  10/03/23 0673

## 2023-10-06 LAB
ATRIAL RATE: 83 BPM
P AXIS: 83 DEGREES
PR INTERVAL: 114 MS
QRS AXIS: 61 DEGREES
QRSD INTERVAL: 118 MS
QT INTERVAL: 404 MS
QTC INTERVAL: 474 MS
T WAVE AXIS: 66 DEGREES
VENTRICULAR RATE: 83 BPM

## 2023-10-06 PROCEDURE — 93010 ELECTROCARDIOGRAM REPORT: CPT | Performed by: INTERNAL MEDICINE

## 2023-11-26 ENCOUNTER — HOSPITAL ENCOUNTER (EMERGENCY)
Facility: HOSPITAL | Age: 83
Discharge: HOME | End: 2023-11-26
Attending: EMERGENCY MEDICINE
Payer: COMMERCIAL

## 2023-11-26 VITALS
HEART RATE: 88 BPM | BODY MASS INDEX: 24.91 KG/M2 | WEIGHT: 135.36 LBS | HEIGHT: 62 IN | TEMPERATURE: 98 F | RESPIRATION RATE: 18 BRPM | DIASTOLIC BLOOD PRESSURE: 64 MMHG | OXYGEN SATURATION: 100 % | SYSTOLIC BLOOD PRESSURE: 181 MMHG

## 2023-11-26 DIAGNOSIS — R21 RASH AND NONSPECIFIC SKIN ERUPTION: Primary | ICD-10-CM

## 2023-11-26 PROCEDURE — 99283 EMERGENCY DEPT VISIT LOW MDM: CPT | Performed by: PHYSICIAN ASSISTANT

## 2023-11-26 PROCEDURE — 99282 EMERGENCY DEPT VISIT SF MDM: CPT

## 2023-11-26 RX ORDER — DIPHENHYDRAMINE HYDROCHLORIDE, ZINC ACETATE 2; .1 G/100G; G/100G
CREAM TOPICAL
Status: COMPLETED
Start: 2023-11-26 | End: 2023-11-26

## 2023-11-26 RX ORDER — DIPHENHYDRAMINE HYDROCHLORIDE, ZINC ACETATE 2; .1 G/100G; G/100G
CREAM TOPICAL ONCE
Status: COMPLETED | OUTPATIENT
Start: 2023-11-26 | End: 2023-11-26

## 2023-11-26 RX ADMIN — DIPHENHYDRAMINE HYDROCHLORIDE, ZINC ACETATE: 2; .1 CREAM TOPICAL at 19:45

## 2023-11-27 NOTE — DISCHARGE INSTRUCTIONS
Please return to the emergency department for worsening symptoms including chest pain, shortness of breath, dizziness, lightheadedness, fever greater than 103, severe pain, inability to walk, fainting episodes, etc.. Please follow-up with your family practice provider as soon as possible. Follow-up with your PCP tomorrow. You may use the cream 3 times daily as needed for itching.

## 2023-11-27 NOTE — ED PROVIDER NOTES
History  Chief Complaint   Patient presents with    Itching     Reports itchy rash on truck since this morning, worsening throughout the day     This is an 50-year-old female with past medical history significant for hypertension and chronic kidney disease presenting to the emergency department today for a diffuse rash. Rash began at approximately 0400 this morning. The rash is itchy but not painful. It is located to the bilateral forearms, trunk, and to the stomach. No new medications. No new soaps or detergents. She denies any chest pain or shortness of breath. She has no difficulty swallowing. She does note she has a history of "sensitive skin". She denies any nausea, vomiting, diarrhea, or constipation. No new food introductions. The patient denies other complaints at this time. History provided by:  Patient   used: No    Rash  Location: Bilateral arms, trunk, stomach. Quality: redness    Severity:  Moderate  Onset quality:  Gradual  Duration: since this AM.  Progression:  Worsening  Chronicity:  New  Relieved by:  None tried  Worsened by:  Nothing  Ineffective treatments:  None tried  Associated symptoms: no abdominal pain, no diarrhea, no fatigue, no fever, no headaches, no hoarse voice, no induration, no joint pain, no myalgias, no nausea, no periorbital edema, no shortness of breath, no sore throat, no throat swelling, no tongue swelling, no URI, not vomiting and not wheezing        Prior to Admission Medications   Prescriptions Last Dose Informant Patient Reported? Taking?    LORazepam (ATIVAN) 0.5 mg tablet   Yes No   Sig: Take 0.5 mg by mouth daily as needed   Trusopt 2 % ophthalmic solution   Yes No   Sig: instill 1 drop into both eyes twice a day   Patient not taking: Reported on 10/10/2022   Xalatan 0.005 % ophthalmic solution   Yes No   Sig: place 1 drop into both eyes at bedtime   candesartan-hydrochlorothiazide (ATACAND HCT) 32-12.5 MG per tablet   Yes No   Sig: take 1 tablet by mouth daily   Patient not taking: Reported on 10/10/2022   potassium chloride (Klor-Con) 10 mEq tablet   Yes No   Sig: Take 10 mEq by mouth daily   valACYclovir (VALTREX) 1,000 mg tablet   No No   Sig: Take 1 tablet (1,000 mg total) by mouth 3 (three) times a day for 7 days      Facility-Administered Medications: None       Past Medical History:   Diagnosis Date    Anxiety     Arthritis     Chronic kidney disease (CKD)     GERD (gastroesophageal reflux disease)     Glaucoma     Hypertension     IBS (irritable bowel syndrome)     Renal disorder        Past Surgical History:   Procedure Laterality Date    APPENDECTOMY      CHOLECYSTECTOMY      PATELLAR TENDON REPAIR Right     TUBAL LIGATION         History reviewed. No pertinent family history. I have reviewed and agree with the history as documented. E-Cigarette/Vaping    E-Cigarette Use Never User      E-Cigarette/Vaping Substances    Nicotine No     THC No     CBD No     Flavoring No     Other No     Unknown No      Social History     Tobacco Use    Smoking status: Never    Smokeless tobacco: Never   Vaping Use    Vaping Use: Never used   Substance Use Topics    Alcohol use: Never    Drug use: Not Currently       Review of Systems   Constitutional:  Negative for appetite change, chills, diaphoresis, fatigue and fever. HENT:  Negative for hoarse voice and sore throat. Eyes:  Negative for visual disturbance. Respiratory:  Negative for cough, chest tightness, shortness of breath and wheezing. Cardiovascular:  Negative for chest pain, palpitations and leg swelling. Gastrointestinal:  Negative for abdominal pain, constipation, diarrhea, nausea and vomiting. Musculoskeletal:  Negative for arthralgias, myalgias, neck pain and neck stiffness. Skin:  Positive for rash. Negative for wound. Neurological:  Negative for dizziness, seizures, syncope, weakness, light-headedness, numbness and headaches.    Psychiatric/Behavioral:  Negative for confusion. All other systems reviewed and are negative. Physical Exam  Physical Exam  Vitals and nursing note reviewed. Constitutional:       General: She is not in acute distress. Appearance: Normal appearance. She is normal weight. She is not ill-appearing, toxic-appearing or diaphoretic. HENT:      Head: Normocephalic and atraumatic. Nose: Nose normal. No congestion or rhinorrhea. Mouth/Throat:      Mouth: Mucous membranes are moist.      Pharynx: No oropharyngeal exudate or posterior oropharyngeal erythema. Eyes:      General: No scleral icterus. Right eye: No discharge. Left eye: No discharge. Extraocular Movements: Extraocular movements intact. Pupils: Pupils are equal, round, and reactive to light. Cardiovascular:      Rate and Rhythm: Normal rate and regular rhythm. Pulses: Normal pulses. Heart sounds: Normal heart sounds. No murmur heard. No friction rub. No gallop. Pulmonary:      Effort: Pulmonary effort is normal. No respiratory distress. Breath sounds: Normal breath sounds. No stridor. No wheezing, rhonchi or rales. Chest:      Chest wall: No tenderness. Musculoskeletal:         General: Normal range of motion. Cervical back: Normal range of motion. No tenderness. Right lower leg: No edema. Left lower leg: No edema. Skin:     General: Skin is warm and dry. Capillary Refill: Capillary refill takes less than 2 seconds. Coloration: Skin is not jaundiced or pale. Comments: Slight reddened rash to the bilateral forearms, trunk, and back; no vesicular lesions; no urticaria; mild excoriations from scratching; no herald patch; negative Nikolsky sign   Neurological:      General: No focal deficit present. Mental Status: She is alert and oriented to person, place, and time. Mental status is at baseline.    Psychiatric:         Mood and Affect: Mood normal.         Behavior: Behavior normal. Vital Signs  ED Triage Vitals [11/26/23 1922]   Temperature Pulse Respirations Blood Pressure SpO2   98 °F (36.7 °C) 88 18 (!) 181/64 100 %      Temp Source Heart Rate Source Patient Position - Orthostatic VS BP Location FiO2 (%)   Oral Monitor Sitting Left arm --      Pain Score       No Pain           Vitals:    11/26/23 1922   BP: (!) 181/64   Pulse: 88   Patient Position - Orthostatic VS: Sitting         Visual Acuity      ED Medications  Medications   diphenhydrAMINE-zinc acetate (BENADRYL) 2-0.1 % cream ( Topical Given 11/26/23 1945)       Diagnostic Studies  Results Reviewed       None                   No orders to display              Procedures  Procedures         ED Course                                             Medical Decision Making  56-year-old female presenting to the emergency department today for a rash. Rash is itchy but not painful. Located to the bilateral forearms, trunk, and abdomen. No new soaps or detergents. No new medications. Vitals are stable. Afebrile. On physical examination, the patient has a red rash without urticaria to the bilateral forearms, trunk, and back. There are some excoriations from scratching. I offered the patient a dose of steroids but the patient notes she cannot take steroids because she is "sensitive to medications". The patient notes she would not like to take any medications by mouth but would be amenable to a cream as long as it is not a steroid cream.  The patient was provided with Benadryl cream while here in the emergency department. Negative Nikolsky sign. Rash appears nonemergent. The patient is stable for discharge at this time. Follow-up with PCP tomorrow. Dermatology follow-up as needed. Patient instructed on how to use Benadryl cream safely. Strict return precautions were given. Recommend PCP follow-up as soon as possible. The patient and/or patient's proxy verify their understanding and agree to the plan at this time.   All questions answered to the patient and/or their proxy's satisfaction. All labs reviewed and utilized in the medical decision making process (if labs were ordered). Portions of the record may have been created with voice recognition software. Occasional wrong word or "sound a like" substitutions may have occurred due to the inherent limitations of voice recognition software. Read the chart carefully and recognize, using context, where substitutions have occurred. I reviewed prior notes. Case discussed with  at bedside. Problems Addressed:  Rash and nonspecific skin eruption: undiagnosed new problem with uncertain prognosis    Amount and/or Complexity of Data Reviewed  Independent Historian: spouse  External Data Reviewed: notes. Risk  OTC drugs. Disposition  Final diagnoses:   Rash and nonspecific skin eruption     Time reflects when diagnosis was documented in both MDM as applicable and the Disposition within this note       Time User Action Codes Description Comment    11/26/2023  7:36 PM Keyon Farr Add [R21] Rash and nonspecific skin eruption           ED Disposition       ED Disposition   Discharge    Condition   Stable    Date/Time   Sun Nov 26, 2023  7:36 PM    [address] discharge to home/self care.                    Follow-up Information       Follow up With Specialties Details Why Contact Info Additional Information    Melissa Vásquez MD Internal Medicine Schedule an appointment as soon as possible for a visit   [address]  [facility] 21        [address] [facility] Emergency Medicine Go to  If symptoms worsen [address] [phone] [id number] [facility] Emergency Department, [address] [facility], [address] [place] [address] [address]            Discharge Medication List as of 11/26/2023  7:38 PM        CONTINUE these medications which have NOT CHANGED    Details candesartan-hydrochlorothiazide (ATACAND HCT) 32-12.5 MG per tablet take 1 tablet by mouth daily, Historical Med      LORazepam (ATIVAN) 0.5 mg tablet Take 0.5 mg by mouth daily as needed, Starting Mon 4/5/2021, Historical Med      potassium chloride (Klor-Con) 10 mEq tablet Take 10 mEq by mouth daily, Starting Mon 4/10/2023, Historical Med      Trusopt 2 % ophthalmic solution instill 1 drop into both eyes twice a day, Historical Med      valACYclovir (VALTREX) 1,000 mg tablet Take 1 tablet (1,000 mg total) by mouth 3 (three) times a day for 7 days, Starting Sat 11/21/2020, Until Sat 11/28/2020, Print      Xalatan 0.005 % ophthalmic solution place 1 drop into both eyes at bedtime, Historical Med             No discharge procedures on file.     PDMP Review       None            ED Provider  Electronically Signed by             Darrian Wing PA-C  11/26/23 2033

## 2024-07-22 ENCOUNTER — HOSPITAL ENCOUNTER (EMERGENCY)
Facility: HOSPITAL | Age: 84
Discharge: HOME | End: 2024-07-22
Attending: EMERGENCY MEDICINE | Admitting: EMERGENCY MEDICINE
Payer: COMMERCIAL

## 2024-07-22 VITALS
BODY MASS INDEX: 20.31 KG/M2 | SYSTOLIC BLOOD PRESSURE: 177 MMHG | RESPIRATION RATE: 16 BRPM | HEIGHT: 65 IN | TEMPERATURE: 97.7 F | HEART RATE: 94 BPM | WEIGHT: 121.91 LBS | DIASTOLIC BLOOD PRESSURE: 77 MMHG | OXYGEN SATURATION: 99 %

## 2024-07-22 DIAGNOSIS — R53.83 FATIGUE: Primary | ICD-10-CM

## 2024-07-22 DIAGNOSIS — R53.1 GENERALIZED WEAKNESS: ICD-10-CM

## 2024-07-22 LAB
2HR DELTA HS TROPONIN: 0 NG/L
ALBUMIN SERPL BCG-MCNC: 4 G/DL (ref 3.5–5)
ALP SERPL-CCNC: 54 U/L (ref 34–104)
ALT SERPL W P-5'-P-CCNC: 15 U/L (ref 7–52)
ANION GAP SERPL CALCULATED.3IONS-SCNC: 9 MMOL/L (ref 4–13)
AST SERPL W P-5'-P-CCNC: 21 U/L (ref 13–39)
BACTERIA UR QL AUTO: ABNORMAL /HPF
BASOPHILS # BLD AUTO: 0.06 THOUSANDS/ÂΜL (ref 0–0.1)
BASOPHILS NFR BLD AUTO: 1 % (ref 0–1)
BILIRUB SERPL-MCNC: 0.33 MG/DL (ref 0.2–1)
BILIRUB UR QL STRIP: NEGATIVE
BUN SERPL-MCNC: 23 MG/DL (ref 5–25)
CALCIUM SERPL-MCNC: 9.7 MG/DL (ref 8.4–10.2)
CARDIAC TROPONIN I PNL SERPL HS: 5 NG/L
CARDIAC TROPONIN I PNL SERPL HS: 5 NG/L
CHLORIDE SERPL-SCNC: 101 MMOL/L (ref 96–108)
CK SERPL-CCNC: 38 U/L (ref 26–192)
CLARITY UR: CLEAR
CO2 SERPL-SCNC: 28 MMOL/L (ref 21–32)
COLOR UR: YELLOW
CREAT SERPL-MCNC: 1.07 MG/DL (ref 0.6–1.3)
EOSINOPHIL # BLD AUTO: 0.35 THOUSAND/ÂΜL (ref 0–0.61)
EOSINOPHIL NFR BLD AUTO: 4 % (ref 0–6)
ERYTHROCYTE [DISTWIDTH] IN BLOOD BY AUTOMATED COUNT: 14.5 % (ref 11.6–15.1)
FLUAV RNA RESP QL NAA+PROBE: NEGATIVE
FLUBV RNA RESP QL NAA+PROBE: NEGATIVE
GFR SERPL CREATININE-BSD FRML MDRD: 47 ML/MIN/1.73SQ M
GLUCOSE SERPL-MCNC: 100 MG/DL (ref 65–140)
GLUCOSE UR STRIP-MCNC: NEGATIVE MG/DL
HCT VFR BLD AUTO: 38.8 % (ref 34.8–46.1)
HGB BLD-MCNC: 12.3 G/DL (ref 11.5–15.4)
HGB UR QL STRIP.AUTO: NEGATIVE
IMM GRANULOCYTES # BLD AUTO: 0.02 THOUSAND/UL (ref 0–0.2)
IMM GRANULOCYTES NFR BLD AUTO: 0 % (ref 0–2)
KETONES UR STRIP-MCNC: NEGATIVE MG/DL
LEUKOCYTE ESTERASE UR QL STRIP: ABNORMAL
LYMPHOCYTES # BLD AUTO: 2.43 THOUSANDS/ÂΜL (ref 0.6–4.47)
LYMPHOCYTES NFR BLD AUTO: 26 % (ref 14–44)
MAGNESIUM SERPL-MCNC: 2.1 MG/DL (ref 1.9–2.7)
MCH RBC QN AUTO: 28.5 PG (ref 26.8–34.3)
MCHC RBC AUTO-ENTMCNC: 31.7 G/DL (ref 31.4–37.4)
MCV RBC AUTO: 90 FL (ref 82–98)
MONOCYTES # BLD AUTO: 0.92 THOUSAND/ÂΜL (ref 0.17–1.22)
MONOCYTES NFR BLD AUTO: 10 % (ref 4–12)
NEUTROPHILS # BLD AUTO: 5.6 THOUSANDS/ÂΜL (ref 1.85–7.62)
NEUTS SEG NFR BLD AUTO: 59 % (ref 43–75)
NITRITE UR QL STRIP: NEGATIVE
NON-SQ EPI CELLS URNS QL MICRO: ABNORMAL /HPF
NRBC BLD AUTO-RTO: 0 /100 WBCS
PH UR STRIP.AUTO: 6 [PH]
PLATELET # BLD AUTO: 244 THOUSANDS/UL (ref 149–390)
PMV BLD AUTO: 12.2 FL (ref 8.9–12.7)
POTASSIUM SERPL-SCNC: 4 MMOL/L (ref 3.5–5.3)
PROT SERPL-MCNC: 7.8 G/DL (ref 6.4–8.4)
PROT UR STRIP-MCNC: NEGATIVE MG/DL
RBC # BLD AUTO: 4.31 MILLION/UL (ref 3.81–5.12)
RBC #/AREA URNS AUTO: ABNORMAL /HPF
RSV RNA RESP QL NAA+PROBE: NEGATIVE
SARS-COV-2 RNA RESP QL NAA+PROBE: NEGATIVE
SODIUM SERPL-SCNC: 138 MMOL/L (ref 135–147)
SP GR UR STRIP.AUTO: 1.01 (ref 1–1.03)
TSH SERPL DL<=0.05 MIU/L-ACNC: 3.53 UIU/ML (ref 0.45–4.5)
UROBILINOGEN UR QL STRIP.AUTO: 0.2 E.U./DL
WBC # BLD AUTO: 9.38 THOUSAND/UL (ref 4.31–10.16)
WBC #/AREA URNS AUTO: ABNORMAL /HPF

## 2024-07-22 PROCEDURE — 82550 ASSAY OF CK (CPK): CPT | Performed by: EMERGENCY MEDICINE

## 2024-07-22 PROCEDURE — 36415 COLL VENOUS BLD VENIPUNCTURE: CPT | Performed by: EMERGENCY MEDICINE

## 2024-07-22 PROCEDURE — 0241U HB NFCT DS VIR RESP RNA 4 TRGT: CPT | Performed by: EMERGENCY MEDICINE

## 2024-07-22 PROCEDURE — 99283 EMERGENCY DEPT VISIT LOW MDM: CPT

## 2024-07-22 PROCEDURE — 83735 ASSAY OF MAGNESIUM: CPT | Performed by: EMERGENCY MEDICINE

## 2024-07-22 PROCEDURE — 80053 COMPREHEN METABOLIC PANEL: CPT | Performed by: EMERGENCY MEDICINE

## 2024-07-22 PROCEDURE — 99285 EMERGENCY DEPT VISIT HI MDM: CPT | Performed by: EMERGENCY MEDICINE

## 2024-07-22 PROCEDURE — 84443 ASSAY THYROID STIM HORMONE: CPT | Performed by: EMERGENCY MEDICINE

## 2024-07-22 PROCEDURE — 84484 ASSAY OF TROPONIN QUANT: CPT | Performed by: EMERGENCY MEDICINE

## 2024-07-22 PROCEDURE — 81001 URINALYSIS AUTO W/SCOPE: CPT | Performed by: EMERGENCY MEDICINE

## 2024-07-22 PROCEDURE — 93005 ELECTROCARDIOGRAM TRACING: CPT

## 2024-07-22 PROCEDURE — 85025 COMPLETE CBC W/AUTO DIFF WBC: CPT | Performed by: EMERGENCY MEDICINE

## 2024-07-22 NOTE — ED PROVIDER NOTES
History  Chief Complaint   Patient presents with    Fatigue     Fatigue x 1 week, no appointments available at PCP     83-year-old female with history of arthritis, CKD, GERD, hypertension, IBS presenting for evaluation of generalized weakness and fatigue.  Patient reports ongoing symptoms for approximately 2 weeks.  She states that she feels incredibly fatigued and tired.  She states she feels exhausted.  Her body feels generally weak.  She otherwise denies chest pain, shortness of breath, abdominal pain, nausea, vomiting, diarrhea, headache, cold-like symptoms, fever, urinary symptoms.  She does endorse aching in both of her legs which has been ongoing since she went to her chiropractor a month ago.  She denies neck pain or back pain.        Prior to Admission Medications   Prescriptions Last Dose Informant Patient Reported? Taking?   LORazepam (ATIVAN) 0.5 mg tablet   Yes No   Sig: Take 0.5 mg by mouth daily as needed   Trusopt 2 % ophthalmic solution   Yes No   Sig: instill 1 drop into both eyes twice a day   Patient not taking: Reported on 10/10/2022   Xalatan 0.005 % ophthalmic solution   Yes No   Sig: place 1 drop into both eyes at bedtime   candesartan-hydrochlorothiazide (ATACAND HCT) 32-12.5 MG per tablet   Yes No   Sig: take 1 tablet by mouth daily   Patient not taking: Reported on 10/10/2022   potassium chloride (Klor-Con) 10 mEq tablet   Yes No   Sig: Take 10 mEq by mouth daily   triamcinolone (KENALOG) 0.5 % cream   Yes No   Sig: Apply 1 Application topically 2 (two) times a day To affected area   valACYclovir (VALTREX) 1,000 mg tablet   No No   Sig: Take 1 tablet (1,000 mg total) by mouth 3 (three) times a day for 7 days      Facility-Administered Medications: None       Past Medical History:   Diagnosis Date    Anxiety     Arthritis     Chronic kidney disease (CKD)     GERD (gastroesophageal reflux disease)     Glaucoma     Hypertension     IBS (irritable bowel syndrome)     Renal disorder         Past Surgical History:   Procedure Laterality Date    APPENDECTOMY      CHOLECYSTECTOMY      PATELLAR TENDON REPAIR Right     TUBAL LIGATION         History reviewed. No pertinent family history.  I have reviewed and agree with the history as documented.    E-Cigarette/Vaping    E-Cigarette Use Never User      E-Cigarette/Vaping Substances    Nicotine No     THC No     CBD No     Flavoring No     Other No     Unknown No      Social History     Tobacco Use    Smoking status: Never    Smokeless tobacco: Never   Vaping Use    Vaping status: Never Used   Substance Use Topics    Alcohol use: Never    Drug use: Not Currently       Review of Systems   Constitutional:  Positive for fatigue. Negative for chills and fever.   Respiratory:  Negative for shortness of breath.    Cardiovascular:  Negative for chest pain.   Gastrointestinal:  Negative for abdominal pain, diarrhea, nausea and vomiting.   Genitourinary:  Negative for dysuria, flank pain and frequency.   Musculoskeletal:  Positive for myalgias. Negative for gait problem.   Skin:  Negative for rash.   Neurological:  Positive for weakness (generalized). Negative for light-headedness and numbness.   All other systems reviewed and are negative.      Physical Exam  Physical Exam  Vitals and nursing note reviewed.   Constitutional:       General: She is not in acute distress.     Appearance: She is well-developed. She is not ill-appearing.   HENT:      Head: Normocephalic and atraumatic.      Nose: Nose normal.      Mouth/Throat:      Mouth: Oropharynx is clear and moist. Mucous membranes are moist.   Eyes:      Extraocular Movements: Extraocular movements intact and EOM normal.      Conjunctiva/sclera: Conjunctivae normal.      Pupils: Pupils are equal, round, and reactive to light.   Cardiovascular:      Rate and Rhythm: Normal rate and regular rhythm.      Heart sounds: No murmur heard.     No friction rub. No gallop.   Pulmonary:      Effort: Pulmonary effort is  normal.      Breath sounds: Normal breath sounds. No wheezing, rhonchi or rales.   Abdominal:      General: There is no distension.      Palpations: Abdomen is soft.      Tenderness: There is no abdominal tenderness.   Musculoskeletal:         General: No swelling, tenderness or edema. Normal range of motion.      Cervical back: Normal range of motion and neck supple.   Skin:     General: Skin is warm and dry.      Coloration: Skin is not pale.      Findings: No rash.   Neurological:      General: No focal deficit present.      Mental Status: She is alert and oriented to person, place, and time.      Cranial Nerves: No cranial nerve deficit.      Sensory: No sensory deficit.      Motor: No weakness.   Psychiatric:         Mood and Affect: Mood and affect normal.         Behavior: Behavior normal.         Vital Signs  ED Triage Vitals [07/22/24 1444]   Temperature Pulse Respirations Blood Pressure SpO2   97.7 °F (36.5 °C) 90 18 166/72 98 %      Temp Source Heart Rate Source Patient Position - Orthostatic VS BP Location FiO2 (%)   Oral Monitor Lying Left arm --      Pain Score       8           Vitals:    07/22/24 1444 07/22/24 1730   BP: 166/72 (!) 177/77   Pulse: 90 94   Patient Position - Orthostatic VS: Lying Sitting         Visual Acuity      ED Medications  Medications - No data to display    Diagnostic Studies  Results Reviewed       Procedure Component Value Units Date/Time    HS Troponin I 2hr [[id number]]  (Normal) Collected: 07/22/24 1759    Lab Status: Final result Specimen: Blood from Arm, Left Updated: 07/22/24 1825     hs TnI 2hr 5 ng/L      Delta 2hr hsTnI 0 ng/L     FLU/RSV/COVID - if FLU/RSV clinically relevant [[id number]]  (Normal) Collected: 07/22/24 1545    Lab Status: Final result Specimen: Nares from Nose Updated: 07/22/24 1649     SARS-CoV-2 Negative     INFLUENZA A PCR Negative     INFLUENZA B PCR Negative     RSV PCR Negative    Narrative:      FOR PEDIATRIC PATIENTS - copy/paste COVID  Guidelines URL to browser: [email or web address]    SARS-CoV-2 assay is a Nucleic Acid Amplification assay intended for the  qualitative detection of nucleic acid from SARS-CoV-2 in nasopharyngeal  swabs. Results are for the presumptive identification of SARS-CoV-2 RNA.    Positive results are indicative of infection with SARS-CoV-2, the virus  causing COVID-19, but do not rule out bacterial infection or co-infection  with other viruses. Laboratories within the [place] and its  territories are required to report all positive results to the appropriate  public health authorities. Negative results do not preclude SARS-CoV-2  infection and should not be used as the sole basis for treatment or other  patient management decisions. Negative results must be combined with  clinical observations, patient history, and epidemiological information.  This test has not been FDA cleared or approved.    This test has been authorized by FDA under an Emergency Use Authorization  (EUA). This test is only authorized for the duration of time the  declaration that circumstances exist justifying the authorization of the  emergency use of an in vitro diagnostic tests for detection of SARS-CoV-2  virus and/or diagnosis of COVID-19 infection under section 564(b)(1) of  the Act, 21 U.S.C. 360bbb-3(b)(1), unless the authorization is terminated  or revoked sooner. The test has been validated but independent review by FDA  and CLIA is pending.    Test performed using [employer] GeneXpert: This RT-PCR assay targets N2,  a region unique to SARS-CoV-2. A conserved region in the E-gene was chosen  for pan-Sarbecovirus detection which includes SARS-CoV-2.    According to CMS-2020-01-R, this platform meets the definition of high-throughput technology.    TSH, 3rd generation with Free T4 reflex [[id number]]  (Normal) Collected: 07/22/24 9582    Lab Status: Final result Specimen: Blood from Arm,  Right Updated: 07/22/24 1638     TSH 3RD GENERATON 3.532 uIU/mL     Urine Microscopic [[id number]]  (Abnormal) Collected: 07/22/24 1546    Lab Status: Final result Specimen: Urine, Clean Catch Updated: 07/22/24 1631     RBC, UA None Seen /hpf      WBC, UA 4-10 /hpf      Epithelial Cells Occasional /hpf      Bacteria, UA Occasional /hpf     CK [[id number]]  (Normal) Collected: 07/22/24 1545    Lab Status: Final result Specimen: Blood from Arm, Right Updated: 07/22/24 1620     Total CK 38 U/L     HS Troponin I 4hr [[id number]]     Lab Status: No result Specimen: Blood     HS Troponin 0hr (reflex protocol) [[id number]]  (Normal) Collected: 07/22/24 1545    Lab Status: Final result Specimen: Blood from Arm, Right Updated: 07/22/24 1618     hs TnI 0hr 5 ng/L     Comprehensive metabolic panel [[id number]] Collected: 07/22/24 1545    Lab Status: Final result Specimen: Blood from Arm, Right Updated: 07/22/24 1613     Sodium 138 mmol/L      Potassium 4.0 mmol/L      Chloride 101 mmol/L      CO2 28 mmol/L      ANION GAP 9 mmol/L      BUN 23 mg/dL      Creatinine 1.07 mg/dL      Glucose 100 mg/dL      Calcium 9.7 mg/dL      AST 21 U/L      ALT 15 U/L      Alkaline Phosphatase 54 U/L      Total Protein 7.8 g/dL      Albumin 4.0 g/dL      Total Bilirubin 0.33 mg/dL      eGFR 47 ml/min/1.73sq m     Narrative:      National Kidney Disease Foundation guidelines for Chronic Kidney Disease (CKD):     Stage 1 with normal or high GFR (GFR > 90 mL/min/1.73 square meters)    Stage 2 Mild CKD (GFR = 60-89 mL/min/1.73 square meters)    Stage 3A Moderate CKD (GFR = 45-59 mL/min/1.73 square meters)    Stage 3B Moderate CKD (GFR = 30-44 mL/min/1.73 square meters)    Stage 4 Severe CKD (GFR = 15-29 mL/min/1.73 square meters)    Stage 5 End Stage CKD (GFR <15 mL/min/1.73 square meters)  Note: GFR calculation is accurate only with a steady state creatinine    Magnesium [[id number]]  (Normal) Collected: 07/22/24 1545    Lab Status: Final result  Specimen: Blood from Arm, Right Updated: 07/22/24 1613     Magnesium 2.1 mg/dL     UA w Reflex to Microscopic w Reflex to Culture [[id number]]  (Abnormal) Collected: 07/22/24 1546    Lab Status: Final result Specimen: Urine, Clean Catch Updated: 07/22/24 1612     Color, UA Yellow     Clarity, UA Clear     Specific Gravity, UA 1.010     pH, UA 6.0     Leukocytes, UA Trace     Nitrite, UA Negative     Protein, UA Negative mg/dl      Glucose, UA Negative mg/dl      Ketones, UA Negative mg/dl      Urobilinogen, UA 0.2 E.U./dl      Bilirubin, UA Negative     Occult Blood, UA Negative    CBC and differential [[id number]] Collected: 07/22/24 1545    Lab Status: Final result Specimen: Blood from Arm, Right Updated: 07/22/24 1555     WBC 9.38 Thousand/uL      RBC 4.31 Million/uL      Hemoglobin 12.3 g/dL      Hematocrit 38.8 %      MCV 90 fL      MCH 28.5 pg      MCHC 31.7 g/dL      RDW 14.5 %      MPV 12.2 fL      Platelets 244 Thousands/uL      nRBC 0 /100 WBCs      Segmented % 59 %      Immature Grans % 0 %      Lymphocytes % 26 %      Monocytes % 10 %      Eosinophils Relative 4 %      Basophils Relative 1 %      Absolute Neutrophils 5.60 Thousands/µL      Absolute Immature Grans 0.02 Thousand/uL      Absolute Lymphocytes 2.43 Thousands/µL      Absolute Monocytes 0.92 Thousand/µL      Eosinophils Absolute 0.35 Thousand/µL      Basophils Absolute 0.06 Thousands/µL                    No orders to display              Procedures  Procedures         ED Course  ED Course as of 07/22/24 1915 Mon Jul 22, 2024   1518 Procedure Note: EKG  Date/Time: 07/22/24 3:13 PM   Interpreted by: Karen Sharma  Indications / Diagnosis: fatigue, generalized weakness  ECG reviewed by me, the ED Provider: yes   The EKG demonstrates:  Rhythm: rate 88, normal sinus  Intervals: short MI  Axis: normal axis  QRS/Blocks: RBBB  ST Changes: No acute ST Changes, no STD/NIMA. No significant change compared to previous EKG on 10/02/2023.   1603 CBC and  differential   1613 UA w Reflex to Microscopic w Reflex to Culture(!)   1614 MAGNESIUM: 2.1   1614 Comprehensive metabolic panel   1618 hs TnI 0hr: 5   1621 Total CK: 38   1631 Urine Microscopic(!)   1639 TSH 3RD GENERATON: 3.532   1650 FLU/RSV/COVID - if FLU/RSV clinically relevant                                               Medical Decision Making  83-year-old female presenting for evaluation of fatigue and generalized weakness.  Vital signs stable on arrival.  Overall benign examination.  Differential diagnoses include but not limited to AZEB, electrolyte abnormality, atypical ACS, arrhythmia, thyroid abnormality.  Labs overall unremarkable including delta troponin.  EKG within normal limits for patient.  She is otherwise stable for discharge at this time.  Advised follow-up with PCP.  Return precautions discussed.    Problems Addressed:  Fatigue: acute illness or injury  Generalized weakness: acute illness or injury    Amount and/or Complexity of Data Reviewed  Labs: ordered. Decision-making details documented in ED Course.  ECG/medicine tests: ordered and independent interpretation performed. Decision-making details documented in ED Course.                 Disposition  Final diagnoses:   Fatigue   Generalized weakness     Time reflects when diagnosis was documented in both MDM as applicable and the Disposition within this note       Time User Action Codes Description Comment    7/22/2024  6:40 PM Karen Sharma Add [R53.83] Fatigue     7/22/2024  6:40 PM Karen Sharma Add [R53.1] Generalized weakness           ED Disposition       ED Disposition   Discharge    Condition   Stable    Date/Time   Mon Jul 22, 2024  6:40 PM    Comment   Amy Muir discharge to home/self care.                   Follow-up Information       Follow up With Specialties Details Why Contact Info    Gómez Fabian MD Internal Medicine Schedule an appointment as soon as possible for a visit   [address] [place] [address]  [address]  [facility]  [id number]  [phone]              Discharge Medication List as of 7/22/2024  6:40 PM        CONTINUE these medications which have NOT CHANGED    Details   candesartan-hydrochlorothiazide (ATACAND HCT) 32-12.5 MG per tablet take 1 tablet by mouth daily, Historical Med      LORazepam (ATIVAN) 0.5 mg tablet Take 0.5 mg by mouth daily as needed, Starting Mon 4/5/2021, Historical Med      potassium chloride (Klor-Con) 10 mEq tablet Take 10 mEq by mouth daily, Starting Mon 4/10/2023, Historical Med      triamcinolone (KENALOG) 0.5 % cream Apply 1 Application topically 2 (two) times a day To affected area, Starting Wed 4/3/2024, Historical Med      Trusopt 2 % ophthalmic solution instill 1 drop into both eyes twice a day, Historical Med      valACYclovir (VALTREX) 1,000 mg tablet Take 1 tablet (1,000 mg total) by mouth 3 (three) times a day for 7 days, Starting Sat 11/21/2020, Until Sat 11/28/2020, Print      Xalatan 0.005 % ophthalmic solution place 1 drop into both eyes at bedtime, Historical Med             No discharge procedures on file.    PDMP Review       None            ED Provider  Electronically Signed by             Karen Sharma MD  07/22/24 7661

## 2024-07-22 NOTE — DISCHARGE INSTRUCTIONS
Follow-up with your primary care physician.  Please return to the emergency department if you develop worsening symptoms, chest pain, difficulty breathing, cannot walk, or anything else concerning to you.

## 2024-07-25 LAB
ATRIAL RATE: 88 BPM
P AXIS: 68 DEGREES
PR INTERVAL: 106 MS
QRS AXIS: -15 DEGREES
QRSD INTERVAL: 112 MS
QT INTERVAL: 392 MS
QTC INTERVAL: 474 MS
T WAVE AXIS: 41 DEGREES
VENTRICULAR RATE: 88 BPM

## 2024-07-25 PROCEDURE — 93010 ELECTROCARDIOGRAM REPORT: CPT | Performed by: INTERNAL MEDICINE

## 2025-03-02 ENCOUNTER — HOSPITAL ENCOUNTER (EMERGENCY)
Facility: HOSPITAL | Age: 85
Discharge: HOME | End: 2025-03-02
Attending: EMERGENCY MEDICINE | Admitting: EMERGENCY MEDICINE
Payer: COMMERCIAL

## 2025-03-02 VITALS
TEMPERATURE: 97.6 F | SYSTOLIC BLOOD PRESSURE: 147 MMHG | DIASTOLIC BLOOD PRESSURE: 68 MMHG | OXYGEN SATURATION: 95 % | HEART RATE: 88 BPM | RESPIRATION RATE: 19 BRPM

## 2025-03-02 DIAGNOSIS — R22.31 LOCALIZED SWELLING ON RIGHT HAND: ICD-10-CM

## 2025-03-02 DIAGNOSIS — R53.83 FATIGUE: Primary | ICD-10-CM

## 2025-03-02 DIAGNOSIS — D64.9 ANEMIA: ICD-10-CM

## 2025-03-02 LAB
ANION GAP SERPL CALCULATED.3IONS-SCNC: 11 MMOL/L (ref 4–13)
ATRIAL RATE: 105 BPM
BASOPHILS # BLD AUTO: 0.05 THOUSANDS/ÂΜL (ref 0–0.1)
BASOPHILS NFR BLD AUTO: 0 % (ref 0–1)
BILIRUB UR QL STRIP: NEGATIVE
BUN SERPL-MCNC: 22 MG/DL (ref 5–25)
CALCIUM SERPL-MCNC: 9.2 MG/DL (ref 8.4–10.2)
CARDIAC TROPONIN I PNL SERPL HS: 4 NG/L (ref ?–50)
CHLORIDE SERPL-SCNC: 103 MMOL/L (ref 96–108)
CLARITY UR: CLEAR
CO2 SERPL-SCNC: 25 MMOL/L (ref 21–32)
COLOR UR: YELLOW
CREAT SERPL-MCNC: 0.91 MG/DL (ref 0.6–1.3)
EOSINOPHIL # BLD AUTO: 0.11 THOUSAND/ÂΜL (ref 0–0.61)
EOSINOPHIL NFR BLD AUTO: 1 % (ref 0–6)
ERYTHROCYTE [DISTWIDTH] IN BLOOD BY AUTOMATED COUNT: 13.3 % (ref 11.6–15.1)
GFR SERPL CREATININE-BSD FRML MDRD: 57 ML/MIN/1.73SQ M
GLUCOSE SERPL-MCNC: 112 MG/DL (ref 65–140)
GLUCOSE UR STRIP-MCNC: NEGATIVE MG/DL
HCT VFR BLD AUTO: 35 % (ref 34.8–46.1)
HGB BLD-MCNC: 11 G/DL (ref 11.5–15.4)
HGB UR QL STRIP.AUTO: NEGATIVE
IMM GRANULOCYTES # BLD AUTO: 0.06 THOUSAND/UL (ref 0–0.2)
IMM GRANULOCYTES NFR BLD AUTO: 1 % (ref 0–2)
KETONES UR STRIP-MCNC: NEGATIVE MG/DL
LEUKOCYTE ESTERASE UR QL STRIP: NEGATIVE
LYMPHOCYTES # BLD AUTO: 2.4 THOUSANDS/ÂΜL (ref 0.6–4.47)
LYMPHOCYTES NFR BLD AUTO: 19 % (ref 14–44)
MCH RBC QN AUTO: 28.1 PG (ref 26.8–34.3)
MCHC RBC AUTO-ENTMCNC: 31.4 G/DL (ref 31.4–37.4)
MCV RBC AUTO: 89 FL (ref 82–98)
MONOCYTES # BLD AUTO: 1.31 THOUSAND/ÂΜL (ref 0.17–1.22)
MONOCYTES NFR BLD AUTO: 10 % (ref 4–12)
NEUTROPHILS # BLD AUTO: 8.64 THOUSANDS/ÂΜL (ref 1.85–7.62)
NEUTS SEG NFR BLD AUTO: 69 % (ref 43–75)
NITRITE UR QL STRIP: NEGATIVE
NRBC BLD AUTO-RTO: 0 /100 WBCS
P AXIS: 56 DEGREES
PH UR STRIP.AUTO: 7.5 [PH]
PLATELET # BLD AUTO: 340 THOUSANDS/UL (ref 149–390)
PMV BLD AUTO: 11.3 FL (ref 8.9–12.7)
POTASSIUM SERPL-SCNC: 3.8 MMOL/L (ref 3.5–5.3)
PR INTERVAL: 126 MS
PROT UR STRIP-MCNC: NEGATIVE MG/DL
QRS AXIS: 7 DEGREES
QRSD INTERVAL: 108 MS
QT INTERVAL: 360 MS
QTC INTERVAL: 475 MS
RBC # BLD AUTO: 3.92 MILLION/UL (ref 3.81–5.12)
SODIUM SERPL-SCNC: 139 MMOL/L (ref 135–147)
SP GR UR STRIP.AUTO: 1.02 (ref 1–1.03)
T WAVE AXIS: 51 DEGREES
TSH SERPL DL<=0.05 MIU/L-ACNC: 3.42 UIU/ML (ref 0.45–4.5)
UROBILINOGEN UR QL STRIP.AUTO: 0.2 E.U./DL
VENTRICULAR RATE: 105 BPM
WBC # BLD AUTO: 12.57 THOUSAND/UL (ref 4.31–10.16)

## 2025-03-02 PROCEDURE — 93010 ELECTROCARDIOGRAM REPORT: CPT | Performed by: INTERNAL MEDICINE

## 2025-03-02 PROCEDURE — 99283 EMERGENCY DEPT VISIT LOW MDM: CPT

## 2025-03-02 PROCEDURE — 81003 URINALYSIS AUTO W/O SCOPE: CPT | Performed by: EMERGENCY MEDICINE

## 2025-03-02 PROCEDURE — 36415 COLL VENOUS BLD VENIPUNCTURE: CPT | Performed by: EMERGENCY MEDICINE

## 2025-03-02 PROCEDURE — 85025 COMPLETE CBC W/AUTO DIFF WBC: CPT | Performed by: EMERGENCY MEDICINE

## 2025-03-02 PROCEDURE — 99285 EMERGENCY DEPT VISIT HI MDM: CPT | Performed by: EMERGENCY MEDICINE

## 2025-03-02 PROCEDURE — 80048 BASIC METABOLIC PNL TOTAL CA: CPT | Performed by: EMERGENCY MEDICINE

## 2025-03-02 PROCEDURE — 84484 ASSAY OF TROPONIN QUANT: CPT | Performed by: EMERGENCY MEDICINE

## 2025-03-02 PROCEDURE — 93005 ELECTROCARDIOGRAM TRACING: CPT

## 2025-03-02 PROCEDURE — 84443 ASSAY THYROID STIM HORMONE: CPT | Performed by: EMERGENCY MEDICINE

## 2025-03-02 RX ORDER — FAMOTIDINE 40 MG/1
40 TABLET, FILM COATED ORAL DAILY
COMMUNITY

## 2025-03-02 RX ADMIN — SODIUM CHLORIDE 1000 ML: 0.9 INJECTION, SOLUTION INTRAVENOUS at 15:41

## 2025-03-02 NOTE — ED PROVIDER NOTES
Time reflects when diagnosis was documented in both MDM as applicable and the Disposition within this note       Time User Action Codes Description Comment    3/2/2025  4:04 PM Ivan Treviño Add [R53.83] Fatigue     3/2/2025  4:06 PM Ivan Treviño Add [N42.1] Congestion and hemorrhage of prostate     3/2/2025  4:06 PM Ivan Treviño Remove [N42.1] Congestion and hemorrhage of prostate     3/2/2025  4:10 PM Ivan Treviño Add [D64.9] Anemia     3/2/2025  4:10 PM Ivan Treviño Add [R22.31] Localized swelling on right hand           ED Disposition       ED Disposition   Discharge    Condition   Stable    Date/Time   Sun Mar 2, 2025  4:03 PM    Comment   Amy Hues discharge to home/self care.                   Assessment & Plan       Medical Decision Making  84-year-old female presenting with fatigue x 2 days associated with rhinorrhea.    Differential including but not limited to fatigue, URI, viral illness, anemia, electrolyte abnormality, hypothyroidism, atypical ACS, arrhythmia.    Blood work with minimal anemia.  Slight leukocytosis.  Recommend recheck with PCP.    Troponin normal.  ECG without any changes.    Regarding the swelling in the right hand.  There is no signs of infectious process.  Not erythematous.  No pain on palpation of the right hand.  No suspicion for acute orthopedic injury.    Likely secondary to arthritis.  Patient declined x-ray today.    I removed the patient's ring off her right fourth digit using a ring compression device.    Will discharge home.  Follow-up with PCP.  Return precautions given.        Problems Addressed:  Anemia: acute illness or injury  Fatigue: acute illness or injury  Localized swelling on right hand: acute illness or injury    Amount and/or Complexity of Data Reviewed  Labs: ordered.        ED Course as of 03/02/25 1613   Sun Mar 02, 2025   1510 Procedure Note: EKG  Date/Time: 03/02/25 3:10 PM   Interpreted by: Ivan Treviño  Indications /  "Diagnosis: Fatigue  ECG reviewed by me, the ED Provider: yes   The EKG demonstrates:  Rhythm: sinus tach  Intervals: normal intervals  Axis: normal axis  QRS/Blocks: normal QRS ecxept inc RBBB  ST Changes: No acute ST Changes, no STD/NIMA.  T wave inv in v2 same as previous       Medications   sodium chloride 0.9 % bolus 1,000 mL (0 mL Intravenous Stopped 3/2/25 1611)       ED Risk Strat Scores                            SBIRT 22yo+      Flowsheet Row Most Recent Value   Initial Alcohol Screen: US AUDIT-C     1. How often do you have a drink containing alcohol? 0 Filed at: 03/02/2025 3444   2. How many drinks containing alcohol do you have on a typical day you are drinking?  0 Filed at: 03/02/2025 1200   3b. FEMALE Any Age, or MALE 65+: How often do you have 4 or more drinks on one occassion? 0 Filed at: 03/02/2025 1436   Audit-C Score 0 Filed at: 03/02/2025 2577   PORSCHE: How many times in the past year have you...    Used an illegal drug or used a prescription medication for non-medical reasons? Never Filed at: 03/02/2025 0246                            History of Present Illness       Chief Complaint   Patient presents with    Fatigue     Pt states she's been feeling \"unwell\" for about a week. Fatigue and malaise with vague symptoms. Right hand swelling stating \"Probably just arthritis or something.\" Denies medications beyond her daily medications.        Past Medical History:   Diagnosis Date    Anxiety     Arthritis     Chronic kidney disease (CKD)     GERD (gastroesophageal reflux disease)     Glaucoma     Hypertension     IBS (irritable bowel syndrome)     Renal disorder       Past Surgical History:   Procedure Laterality Date    APPENDECTOMY      CHOLECYSTECTOMY      PATELLAR TENDON REPAIR Right     TUBAL LIGATION        History reviewed. No pertinent family history.   Social History     Tobacco Use    Smoking status: Never    Smokeless tobacco: Never   Vaping Use    Vaping status: Never Used   Substance Use " Topics    Alcohol use: Never    Drug use: Not Currently      E-Cigarette/Vaping    E-Cigarette Use Never User       E-Cigarette/Vaping Substances    Nicotine No     THC No     CBD No     Flavoring No     Other No     Unknown No       I have reviewed and agree with the history as documented.     84-year-old female previous history of CKD, gastroesophageal reflux, hypertension, IBS, anxiety.  Patient presents the emergency department for fatigue.  Right hand swelling.    Fatigue for 2 days.  Associated with rhinorrhea/congestion.  No other symptoms.    Denies chest pain, dyspnea.    Notes intermittent right hand swelling for multiple years.  Believes it is related to arthritis.  Denies rash, fever.    States that her presentation is consistent with her previous episodes of arthritis.  Denies any trauma.  Denies any pain in the hand on palpation or when it is not moving.    He has a ring on her right fourth digit which is surrounded by swelling.  Notes that has not been taken off multiple years.          Review of Systems   Constitutional:  Positive for fatigue.   Musculoskeletal:         Right hand edema.   All other systems reviewed and are negative.          Objective       ED Triage Vitals [03/02/25 1435]   Temperature Pulse Blood Pressure Respirations SpO2 Patient Position - Orthostatic VS   97.6 °F (36.4 °C) 103 (!) 187/83 18 98 % Lying      Temp Source Heart Rate Source BP Location FiO2 (%) Pain Score    Oral Monitor Left arm -- No Pain      Vitals      Date and Time Temp Pulse SpO2 Resp BP Pain Score FACES Pain Rating User   03/02/25 1530 -- 88 95 % 19 147/68 -- -- DG   03/02/25 1500 -- 102 99 % 18 142/74 -- -- DG   03/02/25 1435 97.6 °F (36.4 °C) 103 98 % 18 187/83 No Pain -- FN            Physical Exam  Vitals and nursing note reviewed.   Constitutional:       General: She is not in acute distress.     Appearance: Normal appearance. She is not ill-appearing.   HENT:      Head: Normocephalic and atraumatic.       Right Ear: External ear normal.      Left Ear: External ear normal.      Nose: Nose normal.      Mouth/Throat:      Mouth: Mucous membranes are moist.   Eyes:      General:         Right eye: No discharge.         Left eye: No discharge.      Conjunctiva/sclera: Conjunctivae normal.   Cardiovascular:      Rate and Rhythm: Normal rate and regular rhythm.      Pulses: Normal pulses.      Heart sounds: No murmur heard.     Comments: Normal radial pulse.  Pulmonary:      Effort: Pulmonary effort is normal.      Breath sounds: Normal breath sounds.   Abdominal:      General: Abdomen is flat. There is no distension.      Tenderness: There is no abdominal tenderness.   Musculoskeletal:         General: Swelling present. No tenderness. Normal range of motion.      Cervical back: Normal range of motion.      Comments: Right hand is swollen.  There is no erythema or warmth.  There is no pain on palpation of the right hand.  Patient has normal range of motion and has pain with moving the joints of her right hand.   Skin:     General: Skin is warm.      Capillary Refill: Capillary refill takes less than 2 seconds.      Findings: No rash.   Neurological:      General: No focal deficit present.      Mental Status: She is alert. Mental status is at baseline.   Psychiatric:         Mood and Affect: Mood normal.         Behavior: Behavior normal.         Results Reviewed       Procedure Component Value Units Date/Time    UA w Reflex to Microscopic w Reflex to Culture [[id number]]  (Normal) Collected: 03/02/25 1543    Lab Status: Final result Specimen: Urine, Clean Catch Updated: 03/02/25 1549     Color, UA Yellow     Clarity, UA Clear     Specific Gravity, UA 1.020     pH, UA 7.5     Leukocytes, UA Negative     Nitrite, UA Negative     Protein, UA Negative mg/dl      Glucose, UA Negative mg/dl      Ketones, UA Negative mg/dl      Urobilinogen, UA 0.2 E.U./dl      Bilirubin, UA Negative     Occult Blood, UA Negative    TSH, 3rd  generation with Free T4 reflex [[id number]]  (Normal) Collected: 03/02/25 1500    Lab Status: Final result Specimen: Blood from Arm, Right Updated: 03/02/25 1538     TSH 3RD GENERATON 3.418 uIU/mL     HS Troponin 0hr (reflex protocol) [[id number]]  (Normal) Collected: 03/02/25 1500    Lab Status: Final result Specimen: Blood from Arm, Right Updated: 03/02/25 1530     hs TnI 0hr 4 ng/L     Basic metabolic panel [[id number]] Collected: 03/02/25 1500    Lab Status: Final result Specimen: Blood from Arm, Right Updated: 03/02/25 1523     Sodium 139 mmol/L      Potassium 3.8 mmol/L      Chloride 103 mmol/L      CO2 25 mmol/L      ANION GAP 11 mmol/L      BUN 22 mg/dL      Creatinine 0.91 mg/dL      Glucose 112 mg/dL      Calcium 9.2 mg/dL      eGFR 57 ml/min/1.73sq m     Narrative:      [employer] guidelines for Chronic Kidney Disease (CKD):     Stage 1 with normal or high GFR (GFR > 90 mL/min/1.73 square meters)    Stage 2 Mild CKD (GFR = 60-89 mL/min/1.73 square meters)    Stage 3A Moderate CKD (GFR = 45-59 mL/min/1.73 square meters)    Stage 3B Moderate CKD (GFR = 30-44 mL/min/1.73 square meters)    Stage 4 Severe CKD (GFR = 15-29 mL/min/1.73 square meters)    Stage 5 End Stage CKD (GFR <15 mL/min/1.73 square meters)  Note: GFR calculation is accurate only with a steady state creatinine    CBC and differential [[id number]]  (Abnormal) Collected: 03/02/25 1500    Lab Status: Final result Specimen: Blood from Arm, Right Updated: 03/02/25 1507     WBC 12.57 Thousand/uL      RBC 3.92 Million/uL      Hemoglobin 11.0 g/dL      Hematocrit 35.0 %      MCV 89 fL      MCH 28.1 pg      MCHC 31.4 g/dL      RDW 13.3 %      MPV 11.3 fL      Platelets 340 Thousands/uL      nRBC 0 /100 WBCs      Segmented % 69 %      Immature Grans % 1 %      Lymphocytes % 19 %      Monocytes % 10 %      Eosinophils Relative 1 %      Basophils Relative 0 %      Absolute Neutrophils 8.64 Thousands/µL      Absolute Immature Grans  0.06 Thousand/uL      Absolute Lymphocytes 2.40 Thousands/µL      Absolute Monocytes 1.31 Thousand/µL      Eosinophils Absolute 0.11 Thousand/µL      Basophils Absolute 0.05 Thousands/µL             No orders to display       General Procedure    Date/Time: 3/2/2025 3:07 PM    Performed by: Ivan Treviño DO  Authorized by: Ivan Treviño DO    Patient location:  ED  Consent:     Consent obtained:  Emergent situation  Indications:     Indications:  Removal of ring with edema in hand/ finger  Anesthesia (see MAR for exact dosages):     Anesthesia method:  None  Procedure Detail:     Equipment used:  Ring rescue pressure cough/ lubricant    Procedure note (site, laterality, method, findings):  R fourth digit    Removal of gold ring  Post-procedure details:     Patient tolerance of procedure:  Tolerated well, no immediate complications      ED Medication and Procedure Management   Prior to Admission Medications   Prescriptions Last Dose Informant Patient Reported? Taking?   LORazepam (ATIVAN) 0.5 mg tablet Not Taking  Yes No   Sig: Take 0.5 mg by mouth daily as needed   Patient not taking: Reported on 3/2/2025   Trusopt 2 % ophthalmic solution   Yes No   Sig: instill 1 drop into both eyes twice a day   Patient not taking: Reported on 10/10/2022   Xalatan 0.005 % ophthalmic solution 3/2/2025  Yes Yes   Sig: place 1 drop into both eyes at bedtime   candesartan-hydrochlorothiazide (ATACAND HCT) 32-12.5 MG per tablet   Yes No   Sig: take 1 tablet by mouth daily   Patient not taking: Reported on 10/10/2022   famotidine (PEPCID) 40 MG tablet 3/2/2025 Self Yes Yes   Sig: Take 40 mg by mouth daily   potassium chloride (Klor-Con) 10 mEq tablet Not Taking  Yes No   Sig: Take 10 mEq by mouth daily   Patient not taking: Reported on 3/2/2025   triamcinolone (KENALOG) 0.5 % cream Not Taking  Yes No   Sig: Apply 1 Application topically 2 (two) times a day To affected area   Patient not taking: Reported on 3/2/2025    valACYclovir (VALTREX) 1,000 mg tablet   No No   Sig: Take 1 tablet (1,000 mg total) by mouth 3 (three) times a day for 7 days      Facility-Administered Medications: None     Discharge Medication List as of 3/2/2025  4:08 PM        CONTINUE these medications which have NOT CHANGED    Details   famotidine (PEPCID) 40 MG tablet Take 40 mg by mouth daily, Historical Med      Xalatan 0.005 % ophthalmic solution place 1 drop into both eyes at bedtime, Historical Med      candesartan-hydrochlorothiazide (ATACAND HCT) 32-12.5 MG per tablet take 1 tablet by mouth daily, Historical Med      LORazepam (ATIVAN) 0.5 mg tablet Take 0.5 mg by mouth daily as needed, Starting Mon 4/5/2021, Historical Med      potassium chloride (Klor-Con) 10 mEq tablet Take 10 mEq by mouth daily, Starting Mon 4/10/2023, Historical Med      triamcinolone (KENALOG) 0.5 % cream Apply 1 Application topically 2 (two) times a day To affected area, Starting Wed 4/3/2024, Historical Med      Trusopt 2 % ophthalmic solution instill 1 drop into both eyes twice a day, Historical Med      valACYclovir (VALTREX) 1,000 mg tablet Take 1 tablet (1,000 mg total) by mouth 3 (three) times a day for 7 days, Starting Sat 11/21/2020, Until Sat 11/28/2020, Print           No discharge procedures on file.  ED SEPSIS DOCUMENTATION   Time reflects when diagnosis was documented in both MDM as applicable and the Disposition within this note       Time User Action Codes Description Comment    3/2/2025  4:04 PM Ivan Treviño [R53.83] Fatigue     3/2/2025  4:06 PM Ivan Treviño Add [N42.1] Congestion and hemorrhage of prostate     3/2/2025  4:06 PM Ivan Treviño Remove [N42.1] Congestion and hemorrhage of prostate     3/2/2025  4:10 PM Ivan Treviño Add [D64.9] Anemia     3/2/2025  4:10 PM Ivan Treviño Add [R22.31] Localized swelling on right hand                  Ivan Treviño, DO  03/02/25 1611

## 2025-03-02 NOTE — DISCHARGE INSTRUCTIONS
Blood work shows no significant abnormalities.  Minimal anemia.  Have this followed up with primary care provider.  EKG was without any changes.  Urine showed no urinary tract infection.  Blood work showed no signs of heart attack.    Follow-up with your primary care provider.  Come back for new or worsening symptoms.